# Patient Record
Sex: FEMALE | Race: WHITE | NOT HISPANIC OR LATINO | Employment: STUDENT | URBAN - METROPOLITAN AREA
[De-identification: names, ages, dates, MRNs, and addresses within clinical notes are randomized per-mention and may not be internally consistent; named-entity substitution may affect disease eponyms.]

---
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
## 2022-02-12 ENCOUNTER — OFFICE VISIT (OUTPATIENT)
Dept: URGENT CARE | Facility: MEDICAL CENTER | Age: 22
End: 2022-02-12
Payer: COMMERCIAL

## 2022-02-12 ENCOUNTER — APPOINTMENT (OUTPATIENT)
Dept: RADIOLOGY | Facility: MEDICAL CENTER | Age: 22
End: 2022-02-12
Payer: COMMERCIAL

## 2022-02-12 VITALS
WEIGHT: 293 LBS | HEIGHT: 71 IN | TEMPERATURE: 98.5 F | BODY MASS INDEX: 41.02 KG/M2 | OXYGEN SATURATION: 100 % | HEART RATE: 95 BPM

## 2022-02-12 DIAGNOSIS — R05.9 COUGH: Primary | ICD-10-CM

## 2022-02-12 DIAGNOSIS — R05.9 COUGH: ICD-10-CM

## 2022-02-12 PROCEDURE — 99213 OFFICE O/P EST LOW 20 MIN: CPT | Performed by: PHYSICIAN ASSISTANT

## 2022-02-12 PROCEDURE — 71046 X-RAY EXAM CHEST 2 VIEWS: CPT

## 2022-02-12 RX ORDER — ALBUTEROL SULFATE 90 UG/1
2 AEROSOL, METERED RESPIRATORY (INHALATION) EVERY 6 HOURS PRN
Qty: 8.5 G | Refills: 0 | Status: SHIPPED | OUTPATIENT
Start: 2022-02-12

## 2022-02-12 RX ORDER — PREDNISONE 20 MG/1
40 TABLET ORAL DAILY
Qty: 10 TABLET | Refills: 0 | Status: SHIPPED | OUTPATIENT
Start: 2022-02-12 | End: 2022-02-17

## 2022-02-12 RX ORDER — LAMOTRIGINE 300 MG/1
1 TABLET, EXTENDED RELEASE ORAL 2 TIMES DAILY
COMMUNITY
Start: 2022-01-18

## 2022-02-12 NOTE — PATIENT INSTRUCTIONS
Cough  Prednisone 40 mg daily x 5 days  proventil 2 puffs every 6 hours as needed  Follow up with PCP in 3-5 days  Proceed to  ER if symptoms worsen  Acute Cough   WHAT YOU NEED TO KNOW:   An acute cough can last up to 3 weeks  Common causes of an acute cough include a cold, allergies, or a lung infection  DISCHARGE INSTRUCTIONS:   Return to the emergency department if:   · You have trouble breathing or feel short of breath  · You cough up blood, or you see blood in your mucus  · You faint or feel weak or dizzy  · You have chest pain when you cough or take a deep breath  · You have new wheezing  Contact your healthcare provider if:   · You have a fever  · Your cough lasts longer than 4 weeks  · Your symptoms do not improve with treatment  · You have questions or concerns about your condition or care  Medicines:   · Medicines  may be needed to stop the cough, decrease swelling in your airways, or help open your airways  Medicine may also be given to help you cough up mucus  Ask your healthcare provider what over-the-counter medicines you can take  If you have an infection caused by bacteria, you may need antibiotics  · Take your medicine as directed  Contact your healthcare provider if you think your medicine is not helping or if you have side effects  Tell him or her if you are allergic to any medicine  Keep a list of the medicines, vitamins, and herbs you take  Include the amounts, and when and why you take them  Bring the list or the pill bottles to follow-up visits  Carry your medicine list with you in case of an emergency  Manage your symptoms:   · Do not smoke and stay away from others who smoke  Nicotine and other chemicals in cigarettes and cigars can cause lung damage and make your cough worse  Ask your healthcare provider for information if you currently smoke and need help to quit  E-cigarettes or smokeless tobacco still contain nicotine   Talk to your healthcare provider before you use these products  · Drink extra liquids as directed  Liquids will help thin and loosen mucus so you can cough it up  Liquids will also help prevent dehydration  Examples of good liquids to drink include water, fruit juice, and broth  Do not drink liquids that contain caffeine  Caffeine can increase your risk for dehydration  Ask your healthcare provider how much liquid to drink each day  · Rest as directed  Do not do activities that make your cough worse, such as exercise  · Use a humidifier or vaporizer  Use a cool mist humidifier or a vaporizer to increase air moisture in your home  This may make it easier for you to breathe and help decrease your cough  · Eat 2 to 5 mL of honey 2 times each day  Honey can help thin mucus and decrease your cough  · Use cough drops or lozenges  These can help decrease throat irritation and your cough  Follow up with your healthcare provider as directed:  Write down your questions so you remember to ask them during your visits  © Copyright [employer] 2021 Information is for End User's use only and may not be sold, redistributed or otherwise used for commercial purposes  All illustrations and images included in CareNotes® are the copyrighted property of [employer]  or [facility]   The above information is an  only  It is not intended as medical advice for individual conditions or treatments  Talk to your doctor, nurse or pharmacist before following any medical regimen to see if it is safe and effective for you

## 2022-02-12 NOTE — PROGRESS NOTES
[facility]        NAME: Saritha Stein is a 25 y o  female  : 2000    MRN: [id number]  DATE: 2022  TIME: 10:10 AM    Assessment and Plan   Cough [R05 9]  1  Cough  XR chest pa & lateral         Patient Instructions     Cough  Prednisone 40 mg daily x 5 days  proventil 2 puffs every 6 hours as needed  Follow up with PCP in 3-5 days  Proceed to  ER if symptoms worsen  Chief Complaint     Chief Complaint   Patient presents with    Cough     lingering about a week now  was told she had covid a month ago     Shortness of Breath     feels wheezing in her sinuses and lungs and PND  she has nose bleeds in the morning   Nausea     constantly feeeling nauseas from the Post nasal drip , she says her chest feels heavy          History of Present Illness       26 y/o female presents c/o non productive cough, and wheezing  States the cough keeps her up at night  Patient denies fever, chills, SOB  States she had covid 1 month ago and had recovered and was symptom free x 1 week and then developed cough and wheezing      Review of Systems   Review of Systems   Constitutional: Negative  HENT: Negative  Eyes: Negative  Respiratory: Positive for cough and wheezing  Negative for chest tightness, shortness of breath and stridor  Cardiovascular: Negative  Negative for chest pain, palpitations and leg swelling           Current Medications       Current Outpatient Medications:     clonazePAM (KlonoPIN) 2 mg tablet, Place 2 mg under the tongue as needed for seizures, Disp: , Rfl:     lamoTRIgine  MG TB24, Take 1 tablet by mouth 2 (two) times a day, Disp: , Rfl:     lamoTRIgine  MG TB24, Take 1 tablet by mouth 2 (two) times a day, Disp: , Rfl:     zonisamide (ZONEGRAN) 100 mg capsule, 100 mg 2 (two) times a day , Disp: , Rfl:     Current Allergies     Allergies as of 2022 - Reviewed 2022   Allergen Reaction Noted    Cetirizine hcl  11/15/2018    Klonopin [clonazepam] Other (See Comments) 2020            The following portions of the patient's history were reviewed and updated as appropriate: allergies, current medications, past family history, past medical history, past social history, past surgical history and problem list      Past Medical History:   Diagnosis Date    Anxiety     Autism     Depression     Seasonal allergies     Seizures ([address] )     last was a year ago    Sensory integration disorder     Tendonitis        Past Surgical History:   Procedure Laterality Date     SECTION      CHOLECYSTECTOMY      10/20    NO PAST SURGERIES      WI LAP,CHOLECYSTECTOMY N/A 10/15/2020    Procedure: CHOLECYSTECTOMY LAPAROSCOPIC;  Surgeon: Angelina Bowen MD;  Location: [facility] MAIN OR;  Service: General       Family History   Problem Relation Age of Onset    Thyroid cancer Mother     Hiatal hernia Mother     Asthma Mother     Diabetes Family     Heart disease Family     Hypertension Family     Esophageal cancer Maternal Grandmother     Diabetes Maternal Grandfather          Medications have been verified  Objective   Pulse 95   Temp 98 5 °F (36 9 °C) (Temporal)   Ht 5' 11" (1 803 m)   Wt (!) 152 kg (334 lb)   SpO2 100%   BMI 46 58 kg/m²        Physical Exam     Physical Exam  Constitutional:       Appearance: She is well-developed  HENT:      Head: Normocephalic and atraumatic  Right Ear: External ear normal       Left Ear: External ear normal       Nose: Nose normal       Mouth/Throat:      Pharynx: No oropharyngeal exudate  Cardiovascular:      Rate and Rhythm: Normal rate and regular rhythm  Heart sounds: Normal heart sounds  Pulmonary:      Effort: Pulmonary effort is normal  No respiratory distress  Breath sounds: Normal breath sounds  No wheezing or rales  Chest:      Chest wall: No tenderness  Musculoskeletal:      Cervical back: Normal range of motion and neck supple     Lymphadenopathy: Cervical: No cervical adenopathy

## 2022-02-14 ENCOUNTER — OFFICE VISIT (OUTPATIENT)
Dept: URGENT CARE | Facility: MEDICAL CENTER | Age: 22
End: 2022-02-14
Payer: COMMERCIAL

## 2022-02-14 ENCOUNTER — APPOINTMENT (OUTPATIENT)
Dept: RADIOLOGY | Facility: MEDICAL CENTER | Age: 22
End: 2022-02-14
Payer: COMMERCIAL

## 2022-02-14 ENCOUNTER — APPOINTMENT (OUTPATIENT)
Dept: URGENT CARE | Facility: MEDICAL CENTER | Age: 22
End: 2022-02-14
Payer: COMMERCIAL

## 2022-02-14 VITALS
HEIGHT: 71 IN | HEART RATE: 80 BPM | SYSTOLIC BLOOD PRESSURE: 139 MMHG | RESPIRATION RATE: 18 BRPM | BODY MASS INDEX: 41.02 KG/M2 | OXYGEN SATURATION: 98 % | DIASTOLIC BLOOD PRESSURE: 75 MMHG | TEMPERATURE: 97.8 F | WEIGHT: 293 LBS

## 2022-02-14 DIAGNOSIS — S20.211A CONTUSION OF RIB ON RIGHT SIDE, INITIAL ENCOUNTER: Primary | ICD-10-CM

## 2022-02-14 DIAGNOSIS — R07.81 RIB PAIN: ICD-10-CM

## 2022-02-14 PROCEDURE — S9083 URGENT CARE CENTER GLOBAL: HCPCS | Performed by: PHYSICIAN ASSISTANT

## 2022-02-14 PROCEDURE — G0382 LEV 3 HOSP TYPE B ED VISIT: HCPCS | Performed by: PHYSICIAN ASSISTANT

## 2022-02-14 PROCEDURE — 71101 X-RAY EXAM UNILAT RIBS/CHEST: CPT

## 2022-02-14 NOTE — PROGRESS NOTES
[facility] Now        NAME: Francisco Chang is a 25 y o  female  : 2000    MRN: [id number]  DATE: 2022  TIME: 12:51 PM    Assessment and Plan   Contusion of rib on right side, initial encounter [S20 211A]  1  Contusion of rib on right side, initial encounter  XR ribs right w pa chest min 3 views         Patient Instructions     Contusion rib  Follow up with PCP in 3-5 days  Proceed to  ER if symptoms worsen  Chief Complaint     Chief Complaint   Patient presents with    Rib Pain     patient states she fell 10 days ago landing on bilateral breast; patient states she has R rib pain worse with inspiration; taking tylenol with no relief          History of Present Illness       25year-old female who presents complaining of right rib pain  Patient states that she previously fell and has been having pain since to the same area  Patient denies shortness of breath, nausea, fevers  Review of Systems   Review of Systems   Constitutional: Negative  HENT: Negative  Eyes: Negative  Respiratory: Negative  Negative for cough, chest tightness, shortness of breath, wheezing and stridor  Cardiovascular: Negative  Negative for chest pain, palpitations and leg swelling           Current Medications       Current Outpatient Medications:     albuterol (ProAir HFA) 90 mcg/act inhaler, Inhale 2 puffs every 6 (six) hours as needed for wheezing, Disp: 8 5 g, Rfl: 0    clonazePAM (KlonoPIN) 2 mg tablet, Place 2 mg under the tongue as needed for seizures, Disp: , Rfl:     lamoTRIgine  MG TB24, Take 1 tablet by mouth 2 (two) times a day, Disp: , Rfl:     lamoTRIgine  MG TB24, Take 1 tablet by mouth 2 (two) times a day, Disp: , Rfl:     predniSONE 20 mg tablet, Take 2 tablets (40 mg total) by mouth daily for 5 days, Disp: 10 tablet, Rfl: 0    zonisamide (ZONEGRAN) 100 mg capsule, 100 mg 2 (two) times a day , Disp: , Rfl:     Current Allergies     Allergies as of 2022 - Reviewed 2022   Allergen Reaction Noted    Cetirizine hcl  11/15/2018    Klonopin [clonazepam] Other (See Comments) 2020            The following portions of the patient's history were reviewed and updated as appropriate: allergies, current medications, past family history, past medical history, past social history, past surgical history and problem list      Past Medical History:   Diagnosis Date    Anxiety     Autism     Depression     Seasonal allergies     Seizures ([address] )     last was a year ago    Sensory integration disorder     Tendonitis        Past Surgical History:   Procedure Laterality Date     SECTION      CHOLECYSTECTOMY      10/20    NO PAST SURGERIES      MI LAP,CHOLECYSTECTOMY N/A 10/15/2020    Procedure: CHOLECYSTECTOMY LAPAROSCOPIC;  Surgeon: Ora Simmons MD;  Location: [address] [place] [address];  Service: General       Family History   Problem Relation Age of Onset    Thyroid cancer Mother     Hiatal hernia Mother     Asthma Mother     Diabetes Family     Heart disease Family     Hypertension Family     Esophageal cancer Maternal Grandmother     Diabetes Maternal Grandfather          Medications have been verified  Objective   /75 (BP Location: Left arm, Patient Position: Sitting)   Pulse 80   Temp 97 8 °F (36 6 °C)   Resp 18   Ht 5' 11" (1 803 m)   Wt (!) 152 kg (334 lb)   SpO2 98%   BMI 46 58 kg/m²        Physical Exam     Physical Exam  Constitutional:       Appearance: She is well-developed  HENT:      Head: Normocephalic and atraumatic  Right Ear: External ear normal       Left Ear: External ear normal       Nose: Nose normal       Mouth/Throat:      Pharynx: No oropharyngeal exudate  Cardiovascular:      Rate and Rhythm: Normal rate and regular rhythm  Heart sounds: Normal heart sounds  Pulmonary:      Effort: Pulmonary effort is normal  No respiratory distress  Breath sounds: Normal breath sounds   No wheezing or rales  Chest:      Chest wall: No tenderness  Abdominal:      General: Bowel sounds are normal  There is no distension  Palpations: Abdomen is soft  There is no mass  Tenderness: There is no abdominal tenderness  There is no right CVA tenderness, left CVA tenderness, guarding or rebound  Musculoskeletal:      Cervical back: Normal range of motion and neck supple  Lymphadenopathy:      Cervical: No cervical adenopathy

## 2022-02-14 NOTE — PATIENT INSTRUCTIONS
Contusion rib  Follow up with PCP in 3-5 days  Proceed to  ER if symptoms worsen  Rib Contusion   WHAT YOU NEED TO KNOW:   A rib contusion is a bruise on one or more of your ribs  DISCHARGE INSTRUCTIONS:   Return to the emergency department if:   · You have increased chest pain  · You have shortness of breath  · You start to cough up blood  · Your pain does not improve with pain medicine  Contact your healthcare provider if:   · You have a cough  · You have a fever  · You have questions or concerns about your condition or care  Medicines: You may need any of the following:  · NSAIDs , such as ibuprofen, help decrease swelling, pain, and fever  This medicine is available with or without a doctor's order  NSAIDs can cause stomach bleeding or kidney problems in certain people  If you take blood thinner medicine, always ask if NSAIDs are safe for you  Always read the medicine label and follow directions  Do not give these medicines to children under 10months of age without direction from your child's healthcare provider  · Prescription pain medicine  may be given  Ask how to take this medicine safely  · Take your medicine as directed  Contact your healthcare provider if you think your medicine is not helping or if you have side effects  Tell him of her if you are allergic to any medicine  Keep a list of the medicines, vitamins, and herbs you take  Include the amounts, and when and why you take them  Bring the list or the pill bottles to follow-up visits  Carry your medicine list with you in case of an emergency  Deep breathing:   · To help prevent pneumonia, take 10 deep breaths every hour, even when you wake up during the night  Brace your ribs with your hands or a pillow while you take deep breaths or cough  This will help decrease your pain  · You may need to use an incentive spirometer to help you take deeper breaths   Put the plastic piece into your mouth and take a very deep breath  Hold your breath as long as you can  Then let out your breath  Do this 10 times in a row every hour while you are awake  Rest:  Rest your ribs to decrease swelling and allow the injury to heal faster  Avoid activities that may cause more pain or damage to your ribs  As your pain decreases, begin movements slowly  Ice:  Ice helps decrease swelling and pain  Ice may also help prevent tissue damage  Use an ice pack or put crushed ice in a plastic bag  Cover it with a towel and place it on your bruised area for 15 to 20 minutes every hour as directed  Follow up with your doctor as directed:  Write down your questions so you remember to ask them during your visits  © Copyright [employer] 2021 Information is for End User's use only and may not be sold, redistributed or otherwise used for commercial purposes  All illustrations and images included in CareNotes® are the copyrighted property of [employer]  or Gene Grant   The above information is an  only  It is not intended as medical advice for individual conditions or treatments  Talk to your doctor, nurse or pharmacist before following any medical regimen to see if it is safe and effective for you

## 2022-03-10 ENCOUNTER — ANNUAL EXAM (OUTPATIENT)
Dept: OBGYN CLINIC | Age: 22
End: 2022-03-10
Payer: COMMERCIAL

## 2022-03-10 VITALS
HEIGHT: 71 IN | SYSTOLIC BLOOD PRESSURE: 124 MMHG | WEIGHT: 293 LBS | DIASTOLIC BLOOD PRESSURE: 82 MMHG | BODY MASS INDEX: 41.02 KG/M2

## 2022-03-10 DIAGNOSIS — Z97.5 NEXPLANON IN PLACE: ICD-10-CM

## 2022-03-10 DIAGNOSIS — Z01.419 ENCOUNTER FOR GYNECOLOGICAL EXAMINATION WITHOUT ABNORMAL FINDING: Primary | ICD-10-CM

## 2022-03-10 PROCEDURE — S0612 ANNUAL GYNECOLOGICAL EXAMINA: HCPCS | Performed by: NURSE PRACTITIONER

## 2022-03-10 RX ORDER — AZELASTINE HCL 205.5 UG/1
SPRAY NASAL
COMMUNITY
Start: 2022-01-08 | End: 2022-03-10 | Stop reason: ALTCHOICE

## 2022-03-10 RX ORDER — PREDNISONE 1 MG/1
TABLET ORAL
COMMUNITY
Start: 2022-01-11 | End: 2022-03-10 | Stop reason: ALTCHOICE

## 2022-03-10 RX ORDER — AMOXICILLIN AND CLAVULANATE POTASSIUM 875; 125 MG/1; MG/1
1 TABLET, FILM COATED ORAL 2 TIMES DAILY WITH MEALS
COMMUNITY
Start: 2022-01-08 | End: 2022-03-10 | Stop reason: ALTCHOICE

## 2022-03-10 NOTE — PATIENT INSTRUCTIONS
Breast Self Exam for Women   AMBULATORY CARE:   A breast self-exam (BSE)  is a way to check your breasts for lumps and other changes  Regular BSEs can help you know how your breasts normally look and feel  Most breast lumps or changes are not cancer, but you should always have them checked by a healthcare provider  Why you should do a BSE:  Breast cancer is the most common type of cancer in women  Even if you have mammograms, you may still want to do a BSE regularly  If you know how your breasts normally feel and look, it may help you know when to contact your healthcare provider  Mammograms can miss some cancers  You may find a lump during a BSE that did not show up on a mammogram   When you should do a BSE:  If you have periods, you may want to do your BSE 1 week after your period ends  This is the time when your breasts may be the least swollen, lumpy, or tender  You can do regular BSEs even if you are breastfeeding or have breast implants  Call your doctor if:   · You find any lumps or changes in your breasts  · You have breast pain or fluid coming from your nipples  · You have questions or concerns about your condition or care  How to do a BSE:       · Look at your breasts in a mirror  Look at the size and shape of each breast and nipple  Check for swelling, lumps, dimpling, scaly skin, or other skin changes  Look for nipple changes, such as a nipple that is painful or beginning to pull inward  Gently squeeze both nipples and check to see if fluid (that is not breast milk) comes out of them  If you find any of these or other breast changes, contact your healthcare provider  Check your breasts while you sit or  the following 3 positions:    ? Hang your arms down at your sides  ? Raise your hands and join them behind your head  ? Put firm pressure with your hands on your hips  Bend slightly forward while you look at your breasts in the mirror  · Lie down and feel your breasts    When you lie down, your breast tissue spreads out evenly over your chest  This makes it easier for you to feel for lumps and anything that may not be normal for your breasts  Do a BSE on one breast at a time  ? Place a small pillow or towel under your left shoulder  Put your left arm behind your head  ? Use the 3 middle fingers of your right hand  Use your fingertip pads, on the top of your fingers  Your fingertip pad is the most sensitive part of your finger  ? Use small circles to feel your breast tissue  Use your fingertip pads to make dime-sized, overlapping circles on your breast and armpits  Use light, medium, and firm pressure  First, press lightly  Second, press with medium pressure to feel a little deeper into the breast  Last, use firm pressure to feel deep within your breast     ? Examine your entire breast area  Examine the breast area from above the breast to below the breast where you feel only ribs  Make small circles with your fingertips, starting in the middle of your armpit  Make circles going up and down the breast area  Continue toward your breast and all the way across it  Examine the area from your armpit all the way over to the middle of your chest (breastbone)  Stop at the middle of your chest     ? Move the pillow or towel to your right shoulder, and put your right arm behind your head  Use the 3 fingertip pads of your left hand, and repeat the above steps to do a BSE on your right breast     What else you can do to check for breast problems or cancer:  Talk to your healthcare provider about mammograms  A mammogram is an x-ray of your breasts to screen for breast cancer or other problems  Your provider can tell you the benefits and risks of mammograms  The first mammogram is usually at age 39 or 48  Your provider may recommend you start at 36 or younger if your risk for breast cancer is high  Mammograms usually continue every 1 to 2 years until age 76         Follow up with your doctor as directed:  Write down your questions so you remember to ask them during your visits  © Copyright [employer] 2022 Information is for End User's use only and may not be sold, redistributed or otherwise used for commercial purposes  All illustrations and images included in CareNotes® are the copyrighted property of [employer]  or Gene Kendrick  The above information is an  only  It is not intended as medical advice for individual conditions or treatments  Talk to your doctor, nurse or pharmacist before following any medical regimen to see if it is safe and effective for you  Safe Sex Practices   AMBULATORY CARE:   Safe sex practices  are ways to prevent pregnancy and the spread of sexually transmitted infections (STIs)  An STI happens when a virus or bacteria are spread through sexual activity  Safe sex practices help decrease or prevent body fluid exchange during sex  Body fluids include saliva, urine, blood, vaginal fluids, and semen  Oral, vaginal, and anal sex can all spread STIs  Seek care immediately if:   · A condom breaks, leaks, or slips off while you are having sex  · You notice sores on your penis, vagina, anal area, or skin around them  · You have had unsafe sex and want to discuss emergency contraception or treatment for STI exposure  Call your doctor if:   · You or your female sex partner might be pregnant  · You have questions or concerns about your condition or care  Safe sex practices to follow before you have sex:   · Talk to a new partner before you have sex  Tell your partner if you have an STI  Ask about his or her sex history and if he or she has a current or past STI  Your partner may need to be tested and treated  Do not have sex while you are being treated for an STI, or with a partner who is being treated  · Limit your number of sex partners  More than one sex partner can increase your risk for an STI   Do not have sex with anyone whose sex history you do not know  · Get tested for STIs if needed  Get tested if you had sex with someone who has an STI  Get tested if you have unprotected sex with any new partner  · Talk to your healthcare provider about birth control  Birth control can help prevent an unwanted pregnancy  There are many different types of birth control  Talk to your healthcare provider about which birth control method is right for you  · Ask about medicines to lower your risk for some STIs:      ? Vaccines  can help protect you from hepatitis A, hepatitis B, and the human papillomavirus (HPV)  The HPV vaccine is usually given at 11 years, but it may be given through 26 years to both females and males  Your provider can give you more information on vaccines to prevent STIs  ? Pre-exposure prophylaxis (PrEP)  may be given if you are at high risk for HIV  PrEP is taken every day to prevent the virus from fully infecting the body  · Do not use alcohol or drugs before sex  These can prevent you from thinking clearly and increase your risk for unsafe sex  Safe sex practices to follow while you are having sex:   · Use condoms and barrier methods for all types of sexual contact  This includes oral, vaginal, and anal sex  Male and female condoms are available  Make sure that the condom fits and is put on correctly  Rubber latex sheets or dental dams can be used for oral sex  Use a new condom or latex barrier each time you have sex  Use latex condoms, if possible  Lambskin or natural condoms do not prevent STIs  If you or your partner is allergic to latex, use a nonlatex product, such as polyurethane  Use a second form of birth control with the condom to prevent pregnancy and STIs  Do not use male and female condoms together  · Only use water-based lubricants during sex  Water-based lubricants help prevent sores or cuts in the vagina or on the penis  Prevent sores or cuts to decrease your risk for an STI   Do not use oil-based lubricants, such as baby oil or hand lotion, with latex condoms or barriers  These will weaken the latex and may cause the condom to break  · Do not use chemicals that irritate your skin  Products that contain chemical irritants, such as spermicides, can irritate the lining of your vagina or rectum  Irritation may cause sores that can increase your risk for an STI  · Be careful when you have sex if you have open sores or cuts  Open sores or cuts may increase your risk for an STI  Keep all open sores or cuts covered during sex  Do not have oral sex if you have cuts or sores in your mouth  · Do not do activities that can pass germs  Do not use saliva as a lubricant or share sex toys  Follow up with your doctor as directed:  Write down your questions so you remember to ask them during your visits  © Copyright [employer] 2022 Information is for End User's use only and may not be sold, redistributed or otherwise used for commercial purposes  All illustrations and images included in CareNotes® are the copyrighted property of [employer] , Inc  or Gene Grant   The above information is an  only  It is not intended as medical advice for individual conditions or treatments  Talk to your doctor, nurse or pharmacist before following any medical regimen to see if it is safe and effective for you
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
## 2022-03-31 ENCOUNTER — CONSULT (OUTPATIENT)
Dept: PULMONOLOGY | Facility: CLINIC | Age: 22
End: 2022-03-31
Payer: COMMERCIAL

## 2022-03-31 VITALS
BODY MASS INDEX: 41.02 KG/M2 | WEIGHT: 293 LBS | HEART RATE: 101 BPM | RESPIRATION RATE: 18 BRPM | SYSTOLIC BLOOD PRESSURE: 118 MMHG | DIASTOLIC BLOOD PRESSURE: 74 MMHG | TEMPERATURE: 97.8 F | OXYGEN SATURATION: 98 % | HEIGHT: 71 IN

## 2022-03-31 DIAGNOSIS — J45.30 MILD PERSISTENT REACTIVE AIRWAY DISEASE WITHOUT COMPLICATION: Primary | ICD-10-CM

## 2022-03-31 DIAGNOSIS — E66.01 MORBID OBESITY (HCC): ICD-10-CM

## 2022-03-31 PROCEDURE — 99204 OFFICE O/P NEW MOD 45 MIN: CPT | Performed by: INTERNAL MEDICINE

## 2022-03-31 RX ORDER — FLUTICASONE PROPIONATE AND SALMETEROL XINAFOATE 115; 21 UG/1; UG/1
2 AEROSOL, METERED RESPIRATORY (INHALATION) 2 TIMES DAILY
Qty: 8 G | Refills: 5 | Status: SHIPPED | OUTPATIENT
Start: 2022-03-31

## 2022-03-31 NOTE — PROGRESS NOTES
Pulmonary Consultation   Bruno Mcclain 25 y o  female MRN: [id number]    Encounter: [id number]      Reason for consultation:   Wheezing    Requesting physician:  Isaías Kwok DO       Impressions:   · Reactive airway disease  · Morbid obesity  Recommendations:  · Advair /21, 2 inhalations twice a day  · Albuterol rescue inhaler 2 inhalations 4 times a day as needed  · Weight loss  · Follow-up in 3 months  Discussion:  The patient has wheezing could be due to reactive airway disease or asthma  She has a family history of asthma  I have started her on Advair /21, 2 inhalations twice a day  She will use the albuterol rescue inhaler 2 inhalations 4 times a day as needed  I have explained to her how to use the rescue inhaler appropriately  I talked her about her weight and the importance of losing weight now than wait until complications start happened  I will see her in 3 months in a follow-up visit  History of Present Illness   HPI:  Bruno Mcclain is a 25 y o  female who is here for evaluation of weeks  The patient stated that she started having wheezing over the last few months after she got infected with COVID  She denies any significant cough or sputum production  She was prescribed albuterol which has helped her  She denies snoring  No nocturnal choking sensation  She feels tired during the day however she does have 9 month twins  Review of systems:  12 point review of systems was completed and was otherwise negative except as listed in HPI        Historical Information   Past Medical History:   Diagnosis Date    Anxiety     Autism     Depression     Seasonal allergies     Seizures ([address] )     last was a year ago    Sensory integration disorder     Tendonitis      Past Surgical History:   Procedure Laterality Date     SECTION      CHOLECYSTECTOMY      10/20    NO PAST SURGERIES      [facility] LAP,CHOLECYSTECTOMY N/A 10/15/2020 Procedure: CHOLECYSTECTOMY LAPAROSCOPIC;  Surgeon: Shayla Lema MD;  Location: [address] [place] [address] OR;  Service: General     Family History   Problem Relation Age of Onset    Thyroid cancer Mother     Hiatal hernia Mother     Asthma Mother     Diabetes Family     Heart disease Family     Hypertension Family     Esophageal cancer Maternal Grandmother     Diabetes Maternal Grandfather        Family History:  Her mother has bronchial asthma  Social History:  The patient lives with her family  She is a lifelong nonsmoker  She smoked marijuana once  Denies other recreational drug use  She works at a [employer]  Meds/Allergies   No current facility-administered medications for this visit  (Not in a hospital admission)    Allergies   Allergen Reactions    Cetirizine Hcl     Klonopin [Clonazepam] Other (See Comments)     Long term use causes suicidal thoughts  Has used short term for seizure rescue       Vitals: Blood pressure 118/74, pulse 101, temperature 97 8 °F (36 6 °C), resp  rate 18, height 5' 11" (1 803 m), weight (!) 152 kg (336 lb), last menstrual period 03/06/2022, SpO2 98 %, not currently breastfeeding ,      Physical exam:        Head/eyes:    Normocephalic, without obvious abnormality, atraumatic,         PERRL, extraocular muscles intact, no scleral icterus    Nose:   Nares normal, septum midline, mucosa normal, no drainage    or sinus tenderness   Throat:   Moist mucous membranes, no thrush   Neck:   Supple, trachea midline, no adenopathy; no carotid    bruit or JVD   Lungs:     Clear breath sounds  No wheezing or rhonchi          Heart:    Regular rate and rhythm, S1 and S2 normal, no murmur, rub   or gallop   Abdomen:     Soft, non-tender, bowel sounds active all four quadrants,     no masses, no organomegaly   Extremities:   Extremities normal, atraumatic, no cyanosis or edema   Skin:   Warm, dry, turgor normal, no rashes or lesions   Neurologic:   CNII-XII intact, normal strength, non-focal             Imaging and other studies: I have personally reviewed pertinent films in PACS chest x-rays reviewed on the [facility] system  It was within normal limits  Ref Range & Units 7/10/21  6:55 AM   Hemoglobin 11 5 - 14 5 g/dL 8 8 Low     Hematocrit 35 0 - 43 0 % 25 9 Low     WBC 4 0 - 10 0 thou/cmm 8 7    RBC 3 70 - 4 70 mill/cmm 3 09 Low     Platelet Count 348 - 350 thou/cmm 254    MPV 7 5 - 11 3 fL 7 9    MCV 80 - 100 fL 84    MCH 26 0 - 34 0 pg 28 5    MCHC 32 0 - 37 0 g/dL 34 0    RDW 12 0 - 16 0 % 15 9      Ref Range & Units 7/10/21  6:55 AM    Glucose 65 - 99 mg/dL 79    BUN 7 - 25 mg/dL 6 Low     Creatinine 0 40 - 1 10 mg/dL 0 46    Sodium 135 - 145 mmol/L 137    Potassium 3 5 - 5 2 mmol/L 4 0    Chloride 100 - 109 mmol/L 111 High     Carbon Dioxide 23 - 31 mmol/L 18 Low     Calcium 8 5 - 10 1 mg/dL 8 3 Low     Alkaline Phosphatase 35 - 120 U/L 163 High     Albumin 3 5 - 4 8 g/dL 2 0 Low     Bilirubin, Total 0 2 - 1 0 mg/dL 0 2    Comment: Use of this assay is not recommended for patients undergoing treatment with eltrombopag due to the potential for falsely elevated results     Protein, Total 6 3 - 8 3 g/dL 5 9 Low     AST <41 U/L 26    ALT <56 U/L 52    Anion Gap 3 - 11 8    eGFR, Non-African American >60 145    eGFR,  >60 168    eGFR Comment  Please refer to initial GFR, CALC footnote    Specimen Collected: 07/10/21  6:55 AM Last Resulted: 07/10/21  9:07 AM   Received From: [address]  Result Received: 08/24/21 11:32 AM    View Encounter     Received Information      Ordered On 7/10/2021  6:55 AM    Ordering Provider Authorizing Provider Ordering User Ordering Department   Generic External Data Provider Generic External Data Provider System Default User GENERIC EXTERNAL DATA DEPARTMENT               Specimen Description:           8/24/2021 11:32 AM          (suggestion)  Result Information displayed in this report will not trend and may not trigger automated decision support         Contains abnormal data COMPREHENSIVE METABOLIC PANEL  Order: [id number]   Ref Range & Units Value   Glucose 65 - 99 mg/dL 79    BUN 7 - 25 mg/dL 6 Low     Creatinine 0 40 - 1 10 mg/dL 0 46    Sodium 135 - 145 mmol/L 137    Potassium 3 5 - 5 2 mmol/L 4 0    Chloride 100 - 109 mmol/L 111 High     Carbon Dioxide 23 - 31 mmol/L 18 Low     Calcium 8 5 - 10 1 mg/dL 8 3 Low     Alkaline Phosphatase 35 - 120 U/L 163 High     Albumin 3 5 - 4 8 g/dL 2 0 Low     Bilirubin, Total 0 2 - 1 0 mg/dL 0 2    Comment: Use of this assay is not recommended for patients undergoing treatment with eltrombopag due to the potential for falsely elevated results     Protein, Total 6 3 - 8 3 g/dL 5 9 Low     AST <41 U/L 26    ALT <56 U/L 52    Anion Gap 3 - 11 8    eGFR, Non-African American >60 145    eGFR,  >60 168          Sartah Mcwilliams MD

## 2022-04-15 ENCOUNTER — TELEPHONE (OUTPATIENT)
Dept: GASTROENTEROLOGY | Facility: CLINIC | Age: 22
End: 2022-04-15

## 2022-04-16 ENCOUNTER — APPOINTMENT (OUTPATIENT)
Dept: LAB | Facility: MEDICAL CENTER | Age: 22
End: 2022-04-16
Payer: COMMERCIAL

## 2022-04-16 DIAGNOSIS — R74.8 ACID PHOSPHATASE ELEVATED: ICD-10-CM

## 2022-04-16 DIAGNOSIS — R53.81 DEBILITY: ICD-10-CM

## 2022-04-16 DIAGNOSIS — G40.309 GENERALIZED EPILEPSY (HCC): ICD-10-CM

## 2022-04-16 DIAGNOSIS — Z32.00 PREGNANCY EXAMINATION OR TEST, PREGNANCY UNCONFIRMED: ICD-10-CM

## 2022-04-16 DIAGNOSIS — R11.0 NAUSEA: ICD-10-CM

## 2022-04-16 LAB
ALBUMIN SERPL BCP-MCNC: 4 G/DL (ref 3.5–5)
ALP SERPL-CCNC: 124 U/L (ref 46–116)
ALT SERPL W P-5'-P-CCNC: 32 U/L (ref 12–78)
AMYLASE SERPL-CCNC: 18 IU/L (ref 25–115)
ANION GAP SERPL CALCULATED.3IONS-SCNC: 2 MMOL/L (ref 4–13)
AST SERPL W P-5'-P-CCNC: 18 U/L (ref 5–45)
B-HCG SERPL-ACNC: <2 MIU/ML
BASOPHILS # BLD AUTO: 0.05 THOUSANDS/ΜL (ref 0–0.1)
BASOPHILS NFR BLD AUTO: 1 % (ref 0–1)
BILIRUB SERPL-MCNC: 0.41 MG/DL (ref 0.2–1)
BUN SERPL-MCNC: 11 MG/DL (ref 5–25)
CALCIUM SERPL-MCNC: 9.2 MG/DL (ref 8.3–10.1)
CHLORIDE SERPL-SCNC: 111 MMOL/L (ref 100–108)
CHOLEST SERPL-MCNC: 162 MG/DL
CO2 SERPL-SCNC: 25 MMOL/L (ref 21–32)
CREAT SERPL-MCNC: 0.87 MG/DL (ref 0.6–1.3)
EOSINOPHIL # BLD AUTO: 0.2 THOUSAND/ΜL (ref 0–0.61)
EOSINOPHIL NFR BLD AUTO: 2 % (ref 0–6)
ERYTHROCYTE [DISTWIDTH] IN BLOOD BY AUTOMATED COUNT: 16.3 % (ref 11.6–15.1)
GFR SERPL CREATININE-BSD FRML MDRD: 96 ML/MIN/1.73SQ M
GLUCOSE P FAST SERPL-MCNC: 98 MG/DL (ref 65–99)
HCT VFR BLD AUTO: 37.4 % (ref 34.8–46.1)
HGB BLD-MCNC: 11.4 G/DL (ref 11.5–15.4)
IMM GRANULOCYTES # BLD AUTO: 0.02 THOUSAND/UL (ref 0–0.2)
IMM GRANULOCYTES NFR BLD AUTO: 0 % (ref 0–2)
LIPASE SERPL-CCNC: 57 U/L (ref 73–393)
LYMPHOCYTES # BLD AUTO: 1.83 THOUSANDS/ΜL (ref 0.6–4.47)
LYMPHOCYTES NFR BLD AUTO: 21 % (ref 14–44)
MCH RBC QN AUTO: 23.8 PG (ref 26.8–34.3)
MCHC RBC AUTO-ENTMCNC: 30.5 G/DL (ref 31.4–37.4)
MCV RBC AUTO: 78 FL (ref 82–98)
MONOCYTES # BLD AUTO: 0.44 THOUSAND/ΜL (ref 0.17–1.22)
MONOCYTES NFR BLD AUTO: 5 % (ref 4–12)
NEUTROPHILS # BLD AUTO: 6.24 THOUSANDS/ΜL (ref 1.85–7.62)
NEUTS SEG NFR BLD AUTO: 71 % (ref 43–75)
NRBC BLD AUTO-RTO: 0 /100 WBCS
PLATELET # BLD AUTO: 425 THOUSANDS/UL (ref 149–390)
PMV BLD AUTO: 9.9 FL (ref 8.9–12.7)
POTASSIUM SERPL-SCNC: 4 MMOL/L (ref 3.5–5.3)
PROT SERPL-MCNC: 7.8 G/DL (ref 6.4–8.2)
RBC # BLD AUTO: 4.79 MILLION/UL (ref 3.81–5.12)
SODIUM SERPL-SCNC: 138 MMOL/L (ref 136–145)
T3 SERPL-MCNC: 1.3 NG/ML (ref 0.6–1.8)
T4 SERPL-MCNC: 10.7 UG/DL (ref 4.7–13.3)
TRIGL SERPL-MCNC: 97 MG/DL
TSH SERPL DL<=0.05 MIU/L-ACNC: 1.71 UIU/ML (ref 0.45–4.5)
URATE SERPL-MCNC: 5.6 MG/DL (ref 2–6.8)
WBC # BLD AUTO: 8.78 THOUSAND/UL (ref 4.31–10.16)

## 2022-04-16 PROCEDURE — 80053 COMPREHEN METABOLIC PANEL: CPT

## 2022-04-16 PROCEDURE — 84436 ASSAY OF TOTAL THYROXINE: CPT

## 2022-04-16 PROCEDURE — 84702 CHORIONIC GONADOTROPIN TEST: CPT

## 2022-04-16 PROCEDURE — 80203 DRUG SCREEN QUANT ZONISAMIDE: CPT

## 2022-04-16 PROCEDURE — 84443 ASSAY THYROID STIM HORMONE: CPT

## 2022-04-16 PROCEDURE — 85025 COMPLETE CBC W/AUTO DIFF WBC: CPT

## 2022-04-16 PROCEDURE — 83690 ASSAY OF LIPASE: CPT

## 2022-04-16 PROCEDURE — 84550 ASSAY OF BLOOD/URIC ACID: CPT

## 2022-04-16 PROCEDURE — 82150 ASSAY OF AMYLASE: CPT

## 2022-04-16 PROCEDURE — 84478 ASSAY OF TRIGLYCERIDES: CPT

## 2022-04-16 PROCEDURE — 84480 ASSAY TRIIODOTHYRONINE (T3): CPT

## 2022-04-16 PROCEDURE — 36415 COLL VENOUS BLD VENIPUNCTURE: CPT

## 2022-04-16 PROCEDURE — 82465 ASSAY BLD/SERUM CHOLESTEROL: CPT

## 2022-04-16 PROCEDURE — 80175 DRUG SCREEN QUAN LAMOTRIGINE: CPT

## 2022-04-19 LAB
LAMOTRIGINE SERPL-MCNC: 3 UG/ML (ref 2–20)
ZONISAMIDE SERPL-MCNC: 21.3 UG/ML (ref 10–40)

## 2022-04-30 ENCOUNTER — NURSE TRIAGE (OUTPATIENT)
Dept: OTHER | Facility: OTHER | Age: 22
End: 2022-04-30

## 2022-04-30 ENCOUNTER — HOSPITAL ENCOUNTER (EMERGENCY)
Facility: HOSPITAL | Age: 22
Discharge: HOME | End: 2022-04-30
Attending: EMERGENCY MEDICINE | Admitting: EMERGENCY MEDICINE
Payer: COMMERCIAL

## 2022-04-30 VITALS
DIASTOLIC BLOOD PRESSURE: 88 MMHG | OXYGEN SATURATION: 98 % | TEMPERATURE: 99.1 F | SYSTOLIC BLOOD PRESSURE: 135 MMHG | RESPIRATION RATE: 21 BRPM | HEART RATE: 92 BPM

## 2022-04-30 DIAGNOSIS — R10.9 ABDOMINAL CRAMPING: ICD-10-CM

## 2022-04-30 DIAGNOSIS — N93.9 ABNORMAL UTERINE BLEEDING: Primary | ICD-10-CM

## 2022-04-30 LAB
ALBUMIN SERPL BCP-MCNC: 3.6 G/DL (ref 3.5–5)
ALP SERPL-CCNC: 130 U/L (ref 46–116)
ALT SERPL W P-5'-P-CCNC: 26 U/L (ref 12–78)
AMORPH PHOS CRY URNS QL MICRO: ABNORMAL /HPF
ANION GAP SERPL CALCULATED.3IONS-SCNC: 9 MMOL/L (ref 4–13)
AST SERPL W P-5'-P-CCNC: 11 U/L (ref 5–45)
BACTERIA UR QL AUTO: ABNORMAL /HPF
BASOPHILS # BLD AUTO: 0.06 THOUSANDS/ΜL (ref 0–0.1)
BASOPHILS NFR BLD AUTO: 1 % (ref 0–1)
BILIRUB SERPL-MCNC: 0.12 MG/DL (ref 0.2–1)
BILIRUB UR QL STRIP: NEGATIVE
BUN SERPL-MCNC: 14 MG/DL (ref 5–25)
CALCIUM SERPL-MCNC: 8.8 MG/DL (ref 8.3–10.1)
CHLORIDE SERPL-SCNC: 106 MMOL/L (ref 100–108)
CLARITY UR: ABNORMAL
CO2 SERPL-SCNC: 25 MMOL/L (ref 21–32)
COLOR UR: YELLOW
CREAT SERPL-MCNC: 1.11 MG/DL (ref 0.6–1.3)
EOSINOPHIL # BLD AUTO: 0.14 THOUSAND/ΜL (ref 0–0.61)
EOSINOPHIL NFR BLD AUTO: 1 % (ref 0–6)
ERYTHROCYTE [DISTWIDTH] IN BLOOD BY AUTOMATED COUNT: 15.7 % (ref 11.6–15.1)
GFR SERPL CREATININE-BSD FRML MDRD: 71 ML/MIN/1.73SQ M
GLUCOSE SERPL-MCNC: 93 MG/DL (ref 65–140)
GLUCOSE UR STRIP-MCNC: NEGATIVE MG/DL
HCG SERPL QL: NEGATIVE
HCT VFR BLD AUTO: 36.5 % (ref 34.8–46.1)
HGB BLD-MCNC: 11.1 G/DL (ref 11.5–15.4)
HGB UR QL STRIP.AUTO: ABNORMAL
IMM GRANULOCYTES # BLD AUTO: 0.02 THOUSAND/UL (ref 0–0.2)
IMM GRANULOCYTES NFR BLD AUTO: 0 % (ref 0–2)
KETONES UR STRIP-MCNC: NEGATIVE MG/DL
LEUKOCYTE ESTERASE UR QL STRIP: NEGATIVE
LYMPHOCYTES # BLD AUTO: 2.51 THOUSANDS/ΜL (ref 0.6–4.47)
LYMPHOCYTES NFR BLD AUTO: 25 % (ref 14–44)
MCH RBC QN AUTO: 24.1 PG (ref 26.8–34.3)
MCHC RBC AUTO-ENTMCNC: 30.4 G/DL (ref 31.4–37.4)
MCV RBC AUTO: 79 FL (ref 82–98)
MONOCYTES # BLD AUTO: 0.51 THOUSAND/ΜL (ref 0.17–1.22)
MONOCYTES NFR BLD AUTO: 5 % (ref 4–12)
NEUTROPHILS # BLD AUTO: 6.83 THOUSANDS/ΜL (ref 1.85–7.62)
NEUTS SEG NFR BLD AUTO: 68 % (ref 43–75)
NITRITE UR QL STRIP: NEGATIVE
NON-SQ EPI CELLS URNS QL MICRO: ABNORMAL /HPF
NRBC BLD AUTO-RTO: 0 /100 WBCS
PH UR STRIP.AUTO: 8.5 [PH]
PLATELET # BLD AUTO: 398 THOUSANDS/UL (ref 149–390)
PMV BLD AUTO: 9.5 FL (ref 8.9–12.7)
POTASSIUM SERPL-SCNC: 3.8 MMOL/L (ref 3.5–5.3)
PROT SERPL-MCNC: 7.5 G/DL (ref 6.4–8.2)
PROT UR STRIP-MCNC: NEGATIVE MG/DL
RBC # BLD AUTO: 4.61 MILLION/UL (ref 3.81–5.12)
RBC #/AREA URNS AUTO: ABNORMAL /HPF
SODIUM SERPL-SCNC: 140 MMOL/L (ref 136–145)
SP GR UR STRIP.AUTO: 1.02 (ref 1–1.03)
UROBILINOGEN UR QL STRIP.AUTO: 0.2 E.U./DL
WBC # BLD AUTO: 10.07 THOUSAND/UL (ref 4.31–10.16)
WBC #/AREA URNS AUTO: ABNORMAL /HPF

## 2022-04-30 PROCEDURE — 80053 COMPREHEN METABOLIC PANEL: CPT | Performed by: EMERGENCY MEDICINE

## 2022-04-30 PROCEDURE — 81001 URINALYSIS AUTO W/SCOPE: CPT | Performed by: EMERGENCY MEDICINE

## 2022-04-30 PROCEDURE — 84703 CHORIONIC GONADOTROPIN ASSAY: CPT | Performed by: EMERGENCY MEDICINE

## 2022-04-30 PROCEDURE — 85025 COMPLETE CBC W/AUTO DIFF WBC: CPT | Performed by: EMERGENCY MEDICINE

## 2022-04-30 PROCEDURE — 99284 EMERGENCY DEPT VISIT MOD MDM: CPT

## 2022-04-30 PROCEDURE — 36415 COLL VENOUS BLD VENIPUNCTURE: CPT | Performed by: EMERGENCY MEDICINE

## 2022-04-30 PROCEDURE — 99284 EMERGENCY DEPT VISIT MOD MDM: CPT | Performed by: EMERGENCY MEDICINE

## 2022-04-30 RX ORDER — DICYCLOMINE HCL 20 MG
20 TABLET ORAL ONCE
Status: COMPLETED | OUTPATIENT
Start: 2022-04-30 | End: 2022-04-30

## 2022-04-30 RX ORDER — DICYCLOMINE HCL 20 MG
20 TABLET ORAL 2 TIMES DAILY
Qty: 20 TABLET | Refills: 0 | Status: SHIPPED | OUTPATIENT
Start: 2022-04-30

## 2022-04-30 RX ORDER — NAPROXEN 250 MG/1
500 TABLET ORAL ONCE
Status: COMPLETED | OUTPATIENT
Start: 2022-04-30 | End: 2022-04-30

## 2022-04-30 RX ORDER — NAPROXEN 500 MG/1
500 TABLET ORAL 2 TIMES DAILY WITH MEALS
Qty: 30 TABLET | Refills: 0 | Status: SHIPPED | OUTPATIENT
Start: 2022-04-30

## 2022-04-30 RX ADMIN — DICYCLOMINE HYDROCHLORIDE 20 MG: 20 TABLET ORAL at 21:11

## 2022-04-30 RX ADMIN — NAPROXEN 500 MG: 250 TABLET ORAL at 21:11

## 2022-04-30 NOTE — TELEPHONE ENCOUNTER
Regarding: bleeding  ----- Message from Meka Peralta sent at 4/30/2022  7:06 PM EDT -----  ''i just got off my period on Tuesday and it started again   I've had some stomach pain and nausea "

## 2022-04-30 NOTE — TELEPHONE ENCOUNTER
Regarding: bleeding  ----- Message from Gustav Felty sent at 4/30/2022  4:36 PM EDT -----  "i just got off my period on Tuesday and it started again   I've had some stomach pain and nausea "

## 2022-04-30 NOTE — TELEPHONE ENCOUNTER
Reason for Disposition   [1] Constant abdominal pain AND [2] present > 2 hours    Answer Assessment - Initial Assessment Questions  1  AMOUNT: "Describe the bleeding that you are having "     - SPOTTING: spotting, or pinkish / brownish mucous discharge; does not fill panti-liner or pad     - MILD:  less than 1 pad / hour; less than patient's usual menstrual bleeding    - MODERATE: 1-2 pads / hour; 1 menstrual cup every 6 hours; small-medium blood clots (e g , pea, grape, small coin)    - SEVERE: soaking 2 or more pads/hour for 2 or more hours; 1 menstrual cup every 2 hours; bleeding not contained by pads or continuous red blood from vagina; large blood clots (e g , golf ball, large coin)       Spotting    2  ONSET: "When did the bleeding begin?" "Is it continuing now?"      Started today    3  MENSTRUAL PERIOD: "When was the last normal menstrual period?" "How is this different than your period?"      4/16 lasted 10 days    4  REGULARITY: "How regular are your periods?"      Typically irregular but not to this extent    5  ABDOMINAL PAIN: "Do you have any pain?" "How bad is the pain?"  (e g , Scale 1-10; mild, moderate, or severe)    - MILD (1-3): doesn't interfere with normal activities, abdomen soft and not tender to touch     - MODERATE (4-7): interferes with normal activities or awakens from sleep, tender to touch     - SEVERE (8-10): excruciating pain, doubled over, unable to do any normal activities       7/10 "only feels comfortable if i'm in a ball, feels a sharp pain that is constantly turning all over stomach"    6  PREGNANCY: "Could you be pregnant?" "Are you sexually active?" "Did you recently give birth?"      Denies    7  BREASTFEEDING: "Are you breastfeeding?"      Denies    8  HORMONES: "Are you taking any hormone medications, prescription or OTC?" (e g , birth control pills, estrogen)      Nexplanon    9   BLOOD THINNERS: "Do you take any blood thinners?" (e g , Coumadin/warfarin, Pradaxa/dabigatran, aspirin)      Denies    10  CAUSE: "What do you think is causing the bleeding?" (e g , recent gyn surgery, recent gyn procedure; known bleeding disorder, cervical cancer, polycystic ovarian disease, fibroids)          Unknown    11  HEMODYNAMIC STATUS: "Are you weak or feeling lightheaded?" If Yes, ask: "Can you stand and walk normally?"         Noted feeling dizzy yesterday during bm     12  OTHER SYMPTOMS: "What other symptoms are you having with the bleeding?" (e g , passed tissue, vaginal discharge, fever, menstrual-type cramps)        Denies, fever   Admits to having sore nipples, nausea, diarrhea/constipation    Protocols used: VAGINAL BLEEDING - ABNORMAL-ADULT-

## 2022-05-01 NOTE — DISCHARGE INSTRUCTIONS
Take bentyl and naproxen for pain, also recommend vitamin with iron to help with anemia   Please make appointment with GYN and primary care for recheck

## 2022-05-01 NOTE — ED PROVIDER NOTES
History  Chief Complaint   Patient presents with    Vaginal Bleeding     last menstrual cycle ended on , started bleeding again today along with cramping and nausea, pt is 10 months post partum (Delivered twins)     HPI  22 yo F presents with vaginal bleeding  Has had menstrual period ending  and restarting today with cramping  States she has had these issues for months, called today to get appointment with GYN who recommended that she go to the ED  Denies vaginal discharge, fevers, dysuria  Prior to Admission Medications   Prescriptions Last Dose Informant Patient Reported? Taking? albuterol (ProAir HFA) 90 mcg/act inhaler  Self No No   Sig: Inhale 2 puffs every 6 (six) hours as needed for wheezing   clonazePAM (KlonoPIN) 2 mg tablet  Self Yes No   Sig: Place 2 mg under the tongue as needed for seizures   Patient not taking: Reported on 3/31/2022    etonogestrel (NEXPLANON) subdermal implant  Self Yes No   Si mg by Subdermal route once   fluticasone-salmeterol (Advair HFA) 115-21 MCG/ACT inhaler   No No   Sig: Inhale 2 puffs 2 (two) times a day Rinse mouth after use     lamoTRIgine  MG TB24  Self Yes No   Sig: Take 1 tablet by mouth 2 (two) times a day   zonisamide (ZONEGRAN) 100 mg capsule  Self Yes No   Si mg 2 (two) times a day       Facility-Administered Medications: None       Past Medical History:   Diagnosis Date    Anxiety     Autism     Depression     Seasonal allergies     Seizures (HCC)     last was a year ago    Sensory integration disorder     Tendonitis        Past Surgical History:   Procedure Laterality Date     SECTION      CHOLECYSTECTOMY      10/20    NO PAST SURGERIES      [place] LAP,CHOLECYSTECTOMY N/A 10/15/2020    Procedure: CHOLECYSTECTOMY LAPAROSCOPIC;  Surgeon: Minerva Low MD;  Location: [address] [place] [address];  Service: General       Family History   Problem Relation Age of Onset    Thyroid cancer Mother     Hiatal hernia Mother     Asthma Mother    [facility] Diabetes Family     Heart disease Family     Hypertension Family     Esophageal cancer Maternal Grandmother     Diabetes Maternal Grandfather      I have reviewed and agree with the history as documented  E-Cigarette/Vaping    E-Cigarette Use Never User      E-Cigarette/Vaping Substances    Nicotine No     THC No     CBD No     Flavoring No      Social History     Tobacco Use    Smoking status: Never Smoker    Smokeless tobacco: Never Used   Vaping Use    Vaping Use: Never used   Substance Use Topics    Alcohol use: No    Drug use: No       Review of Systems   Constitutional: Negative for chills and fever  HENT: Negative for dental problem and ear pain  Eyes: Negative for pain and redness  Respiratory: Negative for cough and shortness of breath  Cardiovascular: Negative for chest pain and palpitations  Gastrointestinal: Positive for nausea  Negative for abdominal pain  Endocrine: Negative for polydipsia and polyphagia  Genitourinary: Positive for vaginal bleeding  Negative for dysuria and frequency  Musculoskeletal: Negative for arthralgias and joint swelling  Skin: Negative for color change and rash  Neurological: Negative for dizziness and headaches  Psychiatric/Behavioral: Negative for behavioral problems and confusion  All other systems reviewed and are negative  Physical Exam  Physical Exam  Vitals and nursing note reviewed  Constitutional:       General: She is not in acute distress  Appearance: She is well-developed  She is not diaphoretic  HENT:      Head: Atraumatic  Right Ear: External ear normal       Left Ear: External ear normal       Nose: Nose normal    Eyes:      Conjunctiva/sclera: Conjunctivae normal       Pupils: Pupils are equal, round, and reactive to light  Neck:      Vascular: No JVD  Cardiovascular:      Rate and Rhythm: Normal rate and regular rhythm  Heart sounds: Normal heart sounds  No murmur heard        Pulmonary: Effort: Pulmonary effort is normal  No respiratory distress  Breath sounds: Normal breath sounds  No wheezing  Abdominal:      General: Bowel sounds are normal  There is no distension  Palpations: Abdomen is soft  Tenderness: There is no abdominal tenderness  Musculoskeletal:         General: Normal range of motion  Cervical back: Normal range of motion and neck supple  Skin:     General: Skin is warm and dry  Capillary Refill: Capillary refill takes less than 2 seconds  Neurological:      Mental Status: She is alert and oriented to person, place, and time  Cranial Nerves: No cranial nerve deficit     Psychiatric:         Behavior: Behavior normal          Vital Signs  ED Triage Vitals   Temperature Pulse Respirations Blood Pressure SpO2   04/30/22 2009 04/30/22 2009 04/30/22 2009 04/30/22 2009 04/30/22 2009   99 1 °F (37 3 °C) 92 21 135/88 98 %      Temp Source Heart Rate Source Patient Position - Orthostatic VS BP Location FiO2 (%)   04/30/22 2009 04/30/22 2009 04/30/22 2009 04/30/22 2009 --   Oral Monitor Sitting Left arm       Pain Score       04/30/22 2111       8           Vitals:    04/30/22 2009   BP: 135/88   Pulse: 92   Patient Position - Orthostatic VS: Sitting         Visual Acuity      ED Medications  Medications   dicyclomine (BENTYL) tablet 20 mg (20 mg Oral Given 4/30/22 2111)   naproxen (NAPROSYN) tablet 500 mg (500 mg Oral Given 4/30/22 2111)       Diagnostic Studies  Results Reviewed     Procedure Component Value Units Date/Time    Urine Microscopic [[id number]]  (Abnormal) Collected: 04/30/22 2148    Lab Status: Final result Specimen: Urine, Clean Catch Updated: 04/30/22 2207     RBC, UA 4-10 /hpf      WBC, UA 2-4 /hpf      Epithelial Cells Occasional /hpf      Bacteria, UA None Seen /hpf      AMORPH PHOSPATES Innumerable /hpf     UA w Reflex to Microscopic w Reflex to Culture [[id number]]  (Abnormal) Collected: 04/30/22 2148    Lab Status: Final result Specimen: Urine, Clean Catch Updated: 04/30/22 2156     Color, UA Yellow     Clarity, UA Turbid     Specific [place], UA 1 020     pH, UA 8 5     Leukocytes, UA Negative     Nitrite, UA Negative     Protein, UA Negative mg/dl      Glucose, UA Negative mg/dl      Ketones, UA Negative mg/dl      Urobilinogen, UA 0 2 E U /dl      Bilirubin, UA Negative     Blood, UA Large    hCG, qualitative pregnancy [[id number]]  (Normal) Collected: 04/30/22 2110    Lab Status: Final result Specimen: Blood from Arm, Left Updated: 04/30/22 2142     Preg, Serum Negative    Comprehensive metabolic panel [[id number]]  (Abnormal) Collected: 04/30/22 2110    Lab Status: Final result Specimen: Blood from Arm, Left Updated: 04/30/22 2139     Sodium 140 mmol/L      Potassium 3 8 mmol/L      Chloride 106 mmol/L      CO2 25 mmol/L      ANION GAP 9 mmol/L      BUN 14 mg/dL      Creatinine 1 11 mg/dL      Glucose 93 mg/dL      Calcium 8 8 mg/dL      AST 11 U/L      ALT 26 U/L      Alkaline Phosphatase 130 U/L      Total Protein 7 5 g/dL      Albumin 3 6 g/dL      Total Bilirubin 0 12 mg/dL      eGFR 71 ml/min/1 73sq m     Narrative:      [facility] [place] guidelines for Chronic Kidney Disease (CKD):     Stage 1 with normal or high GFR (GFR > 90 mL/min/1 73 square meters)    Stage 2 Mild CKD (GFR = 60-89 mL/min/1 73 square meters)    Stage 3A Moderate CKD (GFR = 45-59 mL/min/1 73 square meters)    Stage 3B Moderate CKD (GFR = 30-44 mL/min/1 73 square meters)    Stage 4 Severe CKD (GFR = 15-29 mL/min/1 73 square meters)    Stage 5 End Stage CKD (GFR <15 mL/min/1 73 square meters)  Note: GFR calculation is accurate only with a steady state creatinine    CBC and differential [[id number]]  (Abnormal) Collected: 04/30/22 2110    Lab Status: Final result Specimen: Blood from Arm, Left Updated: 04/30/22 2117     WBC 10 07 Thousand/uL      RBC 4 61 Million/uL      Hemoglobin 11 1 g/dL      Hematocrit 36 5 %      MCV 79 fL      MCH 24 1 pg      MCHC 30 4 g/dL      RDW 15 7 %      MPV 9 5 fL      Platelets 294 Thousands/uL      nRBC 0 /100 WBCs      Neutrophils Relative 68 %      Immat GRANS % 0 %      Lymphocytes Relative 25 %      Monocytes Relative 5 %      Eosinophils Relative 1 %      Basophils Relative 1 %      Neutrophils Absolute 6 83 Thousands/µL      Immature Grans Absolute 0 02 Thousand/uL      Lymphocytes Absolute 2 51 Thousands/µL      Monocytes Absolute 0 51 Thousand/µL      Eosinophils Absolute 0 14 Thousand/µL      Basophils Absolute 0 06 Thousands/µL                  No orders to display              Procedures  Procedures         ED Course                                             MDM  Patient presents with abdominal cramping and vaginal bleeding  Labs show stable anemia  Preg negative  Discussed naproxen, bentyl and follow up with GYN  Disposition  Final diagnoses:   Abnormal uterine bleeding   Abdominal cramping     Time reflects when diagnosis was documented in both MDM as applicable and the Disposition within this note     Time User Action Codes Description Comment    4/30/2022  9:49 PM Floy Sero Add [N93 9] Abnormal uterine bleeding     4/30/2022  9:52 PM Floy Sero Add [R10 9] Abdominal cramping       ED Disposition     ED Disposition Condition Date/Time Comment    Discharge Stable Sat Apr 30, 2022  9:49 PM Noretta Kida discharge to home/self care              Follow-up Information     Follow up With Specialties Details Why [address], [address], DO General Practice Call   [address]      Freedom Lugo MD Obstetrics and Gynecology, Obstetrics, Gynecology Call  for GYN follow up [facility] [phone]            Patient's Medications   Discharge Prescriptions    DICYCLOMINE (BENTYL) 20 MG TABLET    Take 1 tablet (20 mg total) by mouth 2 (two) times a day       Start Date: 4/30/2022 End Date: --       Order Dose: 20 mg       Quantity: 20 tablet    Refills: 0    NAPROXEN (NAPROSYN) 500 MG TABLET    Take 1 tablet (500 mg total) by mouth 2 (two) times a day with meals       Start Date: 4/30/2022 End Date: --       Order Dose: 500 mg       Quantity: 30 tablet    Refills: 0       No discharge procedures on file      PDMP Review     None          ED Provider  Electronically Signed by           Roe Cuadra MD  04/30/22 4911

## 2022-05-27 ENCOUNTER — TELEPHONE (OUTPATIENT)
Dept: PULMONOLOGY | Facility: CLINIC | Age: 22
End: 2022-05-27

## 2022-06-22 ENCOUNTER — APPOINTMENT (OUTPATIENT)
Dept: LAB | Facility: MEDICAL CENTER | Age: 22
End: 2022-06-22
Payer: COMMERCIAL

## 2022-06-22 ENCOUNTER — HOSPITAL ENCOUNTER (OUTPATIENT)
Dept: RADIOLOGY | Facility: MEDICAL CENTER | Age: 22
Discharge: HOME | End: 2022-06-22
Payer: COMMERCIAL

## 2022-06-22 DIAGNOSIS — R10.2 PERINEAL PAIN IN FEMALE: ICD-10-CM

## 2022-06-22 DIAGNOSIS — Z11.59 ENCOUNTER FOR SCREENING FOR OTHER VIRAL DISEASES: ICD-10-CM

## 2022-06-22 LAB
HBV CORE AB SER QL: NORMAL
HBV CORE IGM SER QL: NORMAL
HBV SURFACE AG SER QL: NORMAL
HCV AB SER QL: NORMAL

## 2022-06-22 PROCEDURE — 76856 US EXAM PELVIC COMPLETE: CPT

## 2022-06-22 PROCEDURE — 86705 HEP B CORE ANTIBODY IGM: CPT

## 2022-06-22 PROCEDURE — 86592 SYPHILIS TEST NON-TREP QUAL: CPT

## 2022-06-22 PROCEDURE — 87340 HEPATITIS B SURFACE AG IA: CPT

## 2022-06-22 PROCEDURE — 36415 COLL VENOUS BLD VENIPUNCTURE: CPT

## 2022-06-22 PROCEDURE — 86803 HEPATITIS C AB TEST: CPT

## 2022-06-22 PROCEDURE — 76830 TRANSVAGINAL US NON-OB: CPT

## 2022-06-22 PROCEDURE — 86704 HEP B CORE ANTIBODY TOTAL: CPT

## 2022-06-22 PROCEDURE — 87389 HIV-1 AG W/HIV-1&-2 AB AG IA: CPT

## 2022-06-23 LAB
HIV 1+2 AB+HIV1 P24 AG SERPL QL IA: NORMAL
RPR SER QL: NORMAL

## 2023-03-04 ENCOUNTER — APPOINTMENT (OUTPATIENT)
Dept: LAB | Facility: MEDICAL CENTER | Age: 23
End: 2023-03-04

## 2023-03-04 DIAGNOSIS — M25.50 PAIN IN JOINT, MULTIPLE SITES: ICD-10-CM

## 2023-03-04 DIAGNOSIS — R79.9 ABNORMAL BLOOD CHEMISTRY: ICD-10-CM

## 2023-03-04 DIAGNOSIS — R53.81 DEBILITY: ICD-10-CM

## 2023-03-04 LAB
25(OH)D3 SERPL-MCNC: 19.8 NG/ML (ref 30–100)
ALBUMIN SERPL BCP-MCNC: 3.4 G/DL (ref 3.5–5)
ALP SERPL-CCNC: 93 U/L (ref 46–116)
ALT SERPL W P-5'-P-CCNC: 29 U/L (ref 12–78)
ANION GAP SERPL CALCULATED.3IONS-SCNC: 3 MMOL/L (ref 4–13)
AST SERPL W P-5'-P-CCNC: 16 U/L (ref 5–45)
BASOPHILS # BLD AUTO: 0.03 THOUSANDS/ÂΜL (ref 0–0.1)
BASOPHILS NFR BLD AUTO: 0 % (ref 0–1)
BILIRUB SERPL-MCNC: 0.49 MG/DL (ref 0.2–1)
BUN SERPL-MCNC: 11 MG/DL (ref 5–25)
CALCIUM ALBUM COR SERPL-MCNC: 9.8 MG/DL (ref 8.3–10.1)
CALCIUM SERPL-MCNC: 9.3 MG/DL (ref 8.3–10.1)
CHLORIDE SERPL-SCNC: 108 MMOL/L (ref 96–108)
CHOLEST SERPL-MCNC: 156 MG/DL
CO2 SERPL-SCNC: 26 MMOL/L (ref 21–32)
CREAT SERPL-MCNC: 0.67 MG/DL (ref 0.6–1.3)
EOSINOPHIL # BLD AUTO: 0.2 THOUSAND/ÂΜL (ref 0–0.61)
EOSINOPHIL NFR BLD AUTO: 2 % (ref 0–6)
ERYTHROCYTE [DISTWIDTH] IN BLOOD BY AUTOMATED COUNT: 14.7 % (ref 11.6–15.1)
GFR SERPL CREATININE-BSD FRML MDRD: 126 ML/MIN/1.73SQ M
GLUCOSE P FAST SERPL-MCNC: 102 MG/DL (ref 65–99)
HCT VFR BLD AUTO: 36.9 % (ref 34.8–46.1)
HDLC SERPL-MCNC: 38 MG/DL
HGB BLD-MCNC: 11.6 G/DL (ref 11.5–15.4)
IMM GRANULOCYTES # BLD AUTO: 0.02 THOUSAND/UL (ref 0–0.2)
IMM GRANULOCYTES NFR BLD AUTO: 0 % (ref 0–2)
LDLC SERPL CALC-MCNC: 100 MG/DL (ref 0–100)
LYMPHOCYTES # BLD AUTO: 1.83 THOUSANDS/ÂΜL (ref 0.6–4.47)
LYMPHOCYTES NFR BLD AUTO: 20 % (ref 14–44)
MCH RBC QN AUTO: 24.9 PG (ref 26.8–34.3)
MCHC RBC AUTO-ENTMCNC: 31.4 G/DL (ref 31.4–37.4)
MCV RBC AUTO: 79 FL (ref 82–98)
MONOCYTES # BLD AUTO: 0.4 THOUSAND/ÂΜL (ref 0.17–1.22)
MONOCYTES NFR BLD AUTO: 4 % (ref 4–12)
NEUTROPHILS # BLD AUTO: 6.63 THOUSANDS/ÂΜL (ref 1.85–7.62)
NEUTS SEG NFR BLD AUTO: 74 % (ref 43–75)
NONHDLC SERPL-MCNC: 118 MG/DL
NRBC BLD AUTO-RTO: 0 /100 WBCS
PLATELET # BLD AUTO: 369 THOUSANDS/UL (ref 149–390)
PMV BLD AUTO: 10.2 FL (ref 8.9–12.7)
POTASSIUM SERPL-SCNC: 4 MMOL/L (ref 3.5–5.3)
PROT SERPL-MCNC: 7.2 G/DL (ref 6.4–8.4)
RBC # BLD AUTO: 4.66 MILLION/UL (ref 3.81–5.12)
SODIUM SERPL-SCNC: 137 MMOL/L (ref 135–147)
T3 SERPL-MCNC: 1.4 NG/ML (ref 0.6–1.8)
T4 SERPL-MCNC: 9.7 UG/DL (ref 4.7–13.3)
TRIGL SERPL-MCNC: 92 MG/DL
TSH SERPL DL<=0.05 MIU/L-ACNC: 1.69 UIU/ML (ref 0.45–4.5)
URATE SERPL-MCNC: 5.7 MG/DL (ref 2–7.5)
WBC # BLD AUTO: 9.11 THOUSAND/UL (ref 4.31–10.16)

## 2023-03-09 LAB — F5 GENE MUT ANL BLD/T: NORMAL

## 2023-03-16 ENCOUNTER — ANNUAL EXAM (OUTPATIENT)
Dept: OBGYN CLINIC | Age: 23
End: 2023-03-16

## 2023-03-16 VITALS
BODY MASS INDEX: 41.02 KG/M2 | SYSTOLIC BLOOD PRESSURE: 128 MMHG | WEIGHT: 293 LBS | DIASTOLIC BLOOD PRESSURE: 76 MMHG | HEIGHT: 71 IN

## 2023-03-16 DIAGNOSIS — B37.49 CANDIDA INFECTION OF GENITAL REGION: ICD-10-CM

## 2023-03-16 DIAGNOSIS — Z23 NEED FOR HPV VACCINATION: ICD-10-CM

## 2023-03-16 DIAGNOSIS — Z01.419 ENCOUNTER FOR GYNECOLOGICAL EXAMINATION WITHOUT ABNORMAL FINDING: Primary | ICD-10-CM

## 2023-03-16 DIAGNOSIS — Z23 NEED FOR HPV VACCINE: ICD-10-CM

## 2023-03-16 DIAGNOSIS — N83.202 LEFT OVARIAN CYST: ICD-10-CM

## 2023-03-16 DIAGNOSIS — Z11.3 SCREEN FOR STD (SEXUALLY TRANSMITTED DISEASE): ICD-10-CM

## 2023-03-16 DIAGNOSIS — Z30.46 ENCOUNTER FOR SURVEILLANCE OF NEXPLANON SUBDERMAL CONTRACEPTIVE: ICD-10-CM

## 2023-03-16 RX ORDER — FLUCONAZOLE 150 MG/1
150 TABLET ORAL ONCE
Qty: 2 TABLET | Refills: 2 | Status: SHIPPED | OUTPATIENT
Start: 2023-03-16 | End: 2023-03-20

## 2023-03-16 NOTE — PROGRESS NOTES
Patient presents for:yearly     Menarche-   Last Pap Smear-none  Hx of abnormal paps-none  Birth control-Nexplanon, inserted 12/9/21    Mammogram- Not on file  DXA-  Colonoscopy-    Smoking status-  Last sexual activity-approx 2 months   Family history of uterine, ovarian, cervical or breast cancer-denies    Concerns-none, unsure about HPV, will discuss with mother      Pt requesting Gardasil vaccine, VIS given and reviewed with pt, vaccine admin IM , LD, tolerated well
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
## 2023-03-16 NOTE — PATIENT INSTRUCTIONS
Breast Self Exam for Women   AMBULATORY CARE:   A breast self-exam (BSE)  is a way to check your breasts for lumps and other changes  Regular BSEs can help you know how your breasts normally look and feel  Most breast lumps or changes are not cancer, but you should always have them checked by a healthcare provider  Why you should do a BSE:  Breast cancer is the most common type of cancer in women  Even if you have mammograms, you may still want to do a BSE regularly  If you know how your breasts normally feel and look, it may help you know when to contact your healthcare provider  Mammograms can miss some cancers  You may find a lump during a BSE that did not show up on a mammogram   When you should do a BSE:  If you have periods, you may want to do your BSE 1 week after your period ends  This is the time when your breasts may be the least swollen, lumpy, or tender  You can do regular BSEs even if you are breastfeeding or have breast implants  Call your doctor if:   You find any lumps or changes in your breasts  You have breast pain or fluid coming from your nipples  You have questions or concerns about your condition or care  How to do a BSE:       Look at your breasts in a mirror  Look at the size and shape of each breast and nipple  Check for swelling, lumps, dimpling, scaly skin, or other skin changes  Look for nipple changes, such as a nipple that is painful or beginning to pull inward  Gently squeeze both nipples and check to see if fluid (that is not breast milk) comes out of them  If you find any of these or other breast changes, contact your healthcare provider  Check your breasts while you sit or  the following 3 positions:    [place] your arms down at your sides  Raise your hands and join them behind your head  Put firm pressure with your hands on your hips  Bend slightly forward while you look at your breasts in the mirror  Lie down and feel your breasts    When you lie down, your breast tissue spreads out evenly over your chest  This makes it easier for you to feel for lumps and anything that may not be normal for your breasts  Do a BSE on one breast at a time  Place a small pillow or towel under your left shoulder  Put your left arm behind your head  Use the 3 middle fingers of your right hand  Use your fingertip pads, on the top of your fingers  Your fingertip pad is the most sensitive part of your finger  Use small circles to feel your breast tissue  Use your fingertip pads to make dime-sized, overlapping circles on your breast and armpits  Use light, medium, and firm pressure  First, press lightly  Second, press with medium pressure to feel a little deeper into the breast  Last, use firm pressure to feel deep within your breast     Examine your entire breast area  Examine the breast area from above the breast to below the breast where you feel only ribs  Make small circles with your fingertips, starting in the middle of your armpit  Make circles going up and down the breast area  Continue toward your breast and all the way across it  Examine the area from your armpit all the way over to the middle of your chest (breastbone)  Stop at the middle of your chest     Move the pillow or towel to your right shoulder, and put your right arm behind your head  Use the 3 fingertip pads of your left hand, and repeat the above steps to do a BSE on your right breast   What else you can do to check for breast problems or cancer:  Talk to your healthcare provider about mammograms  A mammogram is an x-ray of your breasts to screen for breast cancer or other problems  Your provider can tell you the benefits and risks of mammograms  The first mammogram is usually at age 39 or 48  Your provider may recommend you start at 36 or younger if your risk for breast cancer is high  Mammograms usually continue every 1 to 2 years until age 76         Follow up with your doctor as directed:  Write down your questions so you remember to ask them during your visits  © Copyright [employer] 2022 Information is for End User's use only and may not be sold, redistributed or otherwise used for commercial purposes  The above information is an  only  It is not intended as medical advice for individual conditions or treatments  Talk to your doctor, nurse or pharmacist before following any medical regimen to see if it is safe and effective for you

## 2023-05-03 DIAGNOSIS — J45.30 MILD PERSISTENT REACTIVE AIRWAY DISEASE WITHOUT COMPLICATION: ICD-10-CM

## 2023-05-03 RX ORDER — FLUTICASONE PROPIONATE AND SALMETEROL XINAFOATE 115; 21 UG/1; UG/1
2 AEROSOL, METERED RESPIRATORY (INHALATION) 2 TIMES DAILY
OUTPATIENT
Start: 2023-05-03

## 2023-05-16 ENCOUNTER — CLINICAL SUPPORT (OUTPATIENT)
Age: 23
End: 2023-05-16

## 2023-05-16 VITALS — HEIGHT: 71 IN | BODY MASS INDEX: 41.02 KG/M2 | WEIGHT: 293 LBS

## 2023-05-16 DIAGNOSIS — Z23 NEED FOR HPV VACCINE: Primary | ICD-10-CM

## 2023-05-16 NOTE — PATIENT INSTRUCTIONS
HPV (Human Papillomavirus) Vaccine for Adults   AMBULATORY CARE:   The human papillomavirus (HPV) vaccine  is an injection given to females and males to protect against human papillomavirus infection  HPV is most commonly spread through sexual activity  It can also be spread from a mother to her baby during delivery  The HPV vaccine is most effective if given before sexual activity begins  This allows your body to build almost complete protection against HPV before you have contact with the virus  The HPV vaccine is still effective after sexual activity has begun  How the vaccine is given:  The HPV vaccine can be given with other vaccines  The vaccine is given in 2 or 3 doses through age 32: The first dose  is given at any time  The second dose  is given 1 to 2 months after the first dose  The third dose, if needed,  is given 6 months after the first dose  Reasons you should not get the HPV vaccine, or should wait to get it: You had a severe allergic reaction to a dose of the vaccine  You are pregnant  Your healthcare provider will tell you when you can get the vaccine  You are sick or have a fever  You may need to wait to get the vaccine until symptoms go away  Risks of the HPV vaccine: You may have pain, redness, or swelling where the shot was given  You may have a fever or headache  You may have an allergic reaction to the vaccine  This can be life-threatening  Call your local emergency number (911 in the [address] [facility]) if:   You have signs of a severe allergic reaction, such as trouble breathing, hives, or wheezing  Seek care immediately if:   You have a high fever or behavior changes that concern you  Call your doctor if:   You have questions or concerns about the HPV vaccine  Apply a warm compress  to the area to relieve swelling and pain  Follow up with your doctor as directed:  Write down your questions so you remember to ask them during your visits    © Copyright [employer] 2022 Information is for End User's use only and may not be sold, redistributed or otherwise used for commercial purposes  The above information is an  only  It is not intended as medical advice for individual conditions or treatments  Talk to your doctor, nurse or pharmacist before following any medical regimen to see if it is safe and effective for you

## 2023-07-06 ENCOUNTER — APPOINTMENT (OUTPATIENT)
Dept: LAB | Facility: MEDICAL CENTER | Age: 23
End: 2023-07-06
Payer: COMMERCIAL

## 2023-07-06 DIAGNOSIS — E55.9 VITAMIN D DEFICIENCY: ICD-10-CM

## 2023-07-06 LAB — 25(OH)D3 SERPL-MCNC: 12.6 NG/ML (ref 30–100)

## 2023-07-06 PROCEDURE — 82306 VITAMIN D 25 HYDROXY: CPT

## 2023-07-06 PROCEDURE — 36415 COLL VENOUS BLD VENIPUNCTURE: CPT

## 2023-08-10 ENCOUNTER — OFFICE VISIT (OUTPATIENT)
Dept: URGENT CARE | Facility: MEDICAL CENTER | Age: 23
End: 2023-08-10
Payer: COMMERCIAL

## 2023-08-10 VITALS
OXYGEN SATURATION: 98 % | RESPIRATION RATE: 18 BRPM | WEIGHT: 293 LBS | HEIGHT: 72 IN | SYSTOLIC BLOOD PRESSURE: 134 MMHG | HEART RATE: 94 BPM | BODY MASS INDEX: 39.68 KG/M2 | TEMPERATURE: 97.8 F | DIASTOLIC BLOOD PRESSURE: 84 MMHG

## 2023-08-10 DIAGNOSIS — L03.116 CELLULITIS OF LEFT LEG: Primary | ICD-10-CM

## 2023-08-10 PROCEDURE — 99213 OFFICE O/P EST LOW 20 MIN: CPT | Performed by: PHYSICIAN ASSISTANT

## 2023-08-10 RX ORDER — CEPHALEXIN 500 MG/1
500 CAPSULE ORAL EVERY 6 HOURS SCHEDULED
Qty: 40 CAPSULE | Refills: 0 | Status: SHIPPED | OUTPATIENT
Start: 2023-08-10 | End: 2023-08-20

## 2023-08-10 NOTE — PATIENT INSTRUCTIONS
1.  Over-the-counter ibuprofen and or acetaminophen as needed for pain or fever. 2.  Apply warm compresses and elevate the left leg 4 times daily for 20 to 30 minutes until improved. 3.  Go to the ER immediately if symptoms worsen or if they do not improve within 48 to 72 hours of antibiotics. 4.  Follow-up with primary care in 7 to 10 days for any persistent symptoms.

## 2023-08-10 NOTE — PROGRESS NOTES
[facility]        NAME: Ivania Correia is a 26 y.o. female  : 2000    MRN: [id number]  DATE: August 10, 2023  TIME: 5:55 PM    Assessment and Plan   Cellulitis of left leg [L03.116]  1. Cellulitis of left leg  cephalexin (KEFLEX) 500 mg capsule            Patient Instructions     1. Over-the-counter ibuprofen and or acetaminophen as needed for pain or fever. 2.  Apply warm compresses and elevate the left leg 4 times daily for 20 to 30 minutes until improved. 3.  Go to the ER immediately if symptoms worsen or if they do not improve within 48 to 72 hours of antibiotics. 4.  Follow-up with primary care in 7 to 10 days for any persistent symptoms. Chief Complaint     Chief Complaint   Patient presents with   • Rash     Pt. With a rash to her left lower leg for the past two days          History of Present Illness       61-year-old female patient with a 2-day history of worsening left lower leg redness, swelling, tenderness, warmth. Patient knows of no known injuries, insect bites, etc.  Patient states there is mild itch to the area as well but the pain is much worse than the itch. Denies any calf swelling or pain. Denies any distal paresthesias. No shortness of breath or throat swelling. No fever, chills, body aches otherwise. No nausea or vomiting. Review of Systems   Review of Systems   Constitutional: Negative for chills and fever. HENT: Negative for ear pain and sore throat. Eyes: Negative for pain and visual disturbance. Respiratory: Negative for cough and shortness of breath. Cardiovascular: Negative for chest pain and palpitations. Gastrointestinal: Negative for abdominal pain and vomiting. Genitourinary: Negative for dysuria and hematuria. Musculoskeletal: Negative for arthralgias and back pain. Skin: Positive for color change and rash. Negative for wound. Neurological: Negative for seizures and syncope.    All other systems reviewed and are negative.         Current Medications       Current Outpatient Medications:   •  Advair -21 MCG/ACT inhaler, INHALE 2 PUFFS 2 (TWO) TIMES A DAY RINSE MOUTH AFTER USE., Disp: 12 g, Rfl: 0  •  albuterol (ProAir HFA) 90 mcg/act inhaler, Inhale 2 puffs every 6 (six) hours as needed for wheezing, Disp: 8.5 g, Rfl: 0  •  cephalexin (KEFLEX) 500 mg capsule, Take 1 capsule (500 mg total) by mouth every 6 (six) hours for 10 days, Disp: 40 capsule, Rfl: 0  •  clonazePAM (KlonoPIN) 2 mg tablet, Place 2 mg under the tongue as needed for seizures, Disp: , Rfl:   •  etonogestrel (NEXPLANON) subdermal implant, 68 mg by Subdermal route once, Disp: , Rfl:   •  lamoTRIgine  MG TB24, Take 1 tablet by mouth 2 (two) times a day, Disp: , Rfl:   •  naproxen (NAPROSYN) 500 mg tablet, Take 1 tablet (500 mg total) by mouth 2 (two) times a day with meals, Disp: 30 tablet, Rfl: 0  •  zonisamide (ZONEGRAN) 100 mg capsule, 100 mg 2 (two) times a day , Disp: , Rfl:     Current Allergies     Allergies as of 08/10/2023 - Reviewed 08/10/2023   Allergen Reaction Noted   • Cetirizine hcl  11/15/2018   • Klonopin [clonazepam] Other (See Comments) 2020            The following portions of the patient's history were reviewed and updated as appropriate: allergies, current medications, past family history, past medical history, past social history, past surgical history and problem list.     Past Medical History:   Diagnosis Date   • Anxiety    • Autism    • Depression    • Epilepsy ([address])    • Hypertension    • Ovarian cyst    • Seasonal allergies    • Seizures ([address])     last was a year ago   • Sensory integration disorder    • Tendonitis        Past Surgical History:   Procedure Laterality Date   •  SECTION     • CHOLECYSTECTOMY      10/20   • NO PAST SURGERIES     • [place] LAPAROSCOPY SURG CHOLECYSTECTOMY N/A 10/15/2020    Procedure: CHOLECYSTECTOMY LAPAROSCOPIC;  Surgeon: Subha Gray MD;  Location: [address] [place] [address];  Service: General Family History   Problem Relation Age of Onset   • Thyroid cancer Mother    • Hiatal hernia Mother    • Asthma Mother    • Factor V Leiden deficiency Mother    • Cancer Mother    • Esophageal cancer Maternal Grandmother    • Diabetes Maternal Grandfather    • Heart failure Maternal Grandfather    • Hypertension Maternal Grandfather    • Diabetes Family    • Heart disease Family    • Hypertension Family          Medications have been verified. Objective   /84   Pulse 94   Temp 97.8 °F (36.6 °C)   Resp 18   Ht 6' 1" (1.854 m)   Wt (!) 163 kg (360 lb)   SpO2 98%   BMI 47.50 kg/m²        Physical Exam     Physical Exam  Constitutional:       General: She is not in acute distress. Appearance: Normal appearance. She is not ill-appearing. HENT:      Head: Normocephalic. Nose: Nose normal.      Mouth/Throat:      Mouth: Mucous membranes are moist.      Pharynx: Oropharynx is clear. Eyes:      Conjunctiva/sclera: Conjunctivae normal.      Pupils: Pupils are equal, round, and reactive to light. Cardiovascular:      Rate and Rhythm: Normal rate. Pulmonary:      Effort: Pulmonary effort is normal.      Breath sounds: Normal breath sounds. Abdominal:      Tenderness: There is no abdominal tenderness. Musculoskeletal:         General: Normal range of motion. Cervical back: Normal range of motion. Skin:     Capillary Refill: Capillary refill takes less than 2 seconds. Comments: Distal two thirds of left lower leg just above the ankle red, warm, tender to touch. Subtle swelling noted. Calf nontender, Homans' sign negative. Distal neurovascular exam is intact. No skin injury seen. No red streaking. Neurological:      Mental Status: She is alert and oriented to person, place, and time.    Psychiatric:         Mood and Affect: Mood normal.         Behavior: Behavior normal.

## 2023-09-19 ENCOUNTER — CLINICAL SUPPORT (OUTPATIENT)
Age: 23
End: 2023-09-19
Payer: COMMERCIAL

## 2023-09-19 VITALS — WEIGHT: 293 LBS | HEIGHT: 72 IN | BODY MASS INDEX: 39.68 KG/M2

## 2023-09-19 DIAGNOSIS — Z23 NEED FOR HPV VACCINE: Primary | ICD-10-CM

## 2023-09-19 PROBLEM — E66.01 OBESITY, MORBID, BMI 40.0-49.9 (HCC): Status: ACTIVE | Noted: 2023-08-12

## 2023-09-19 PROCEDURE — 90651 9VHPV VACCINE 2/3 DOSE IM: CPT

## 2023-09-19 PROCEDURE — 90471 IMMUNIZATION ADMIN: CPT

## 2023-09-19 NOTE — PATIENT INSTRUCTIONS
HPV (Human Papillomavirus) Vaccine for Adults   AMBULATORY CARE:   The human papillomavirus (HPV) vaccine  is an injection given to females and males to protect against human papillomavirus infection. HPV is most commonly spread through sexual activity. It can also be spread from a mother to her baby during delivery. The HPV vaccine is most effective if given before sexual activity begins. This allows your body to build almost complete protection against HPV before you have contact with the virus. The HPV vaccine is still effective after sexual activity has begun. How the vaccine is given:  The HPV vaccine can be given with other vaccines. The vaccine is given in 2 or 3 doses through age 32: The first dose  is given at any time. The second dose  is given 1 to 2 months after the first dose. The third dose, if needed,  is given 6 months after the first dose. Reasons you should not get the HPV vaccine, or should wait to get it: You had a severe allergic reaction to a dose of the vaccine. You are pregnant. Your healthcare provider will tell you when you can get the vaccine. You are sick or have a fever. You may need to wait to get the vaccine until symptoms go away. Risks of the HPV vaccine: You may have pain, redness, or swelling where the shot was given. You may have a fever or headache. You may have an allergic reaction to the vaccine. This can be life-threatening. Call your local emergency number (911 in the [address]) if:   You have signs of a severe allergic reaction, such as trouble breathing, hives, or wheezing. Seek care immediately if:   You have a high fever or behavior changes that concern you. Call your doctor if:   You have questions or concerns about the HPV vaccine. Apply a warm compress  to the area to relieve swelling and pain. Follow up with your doctor as directed:  Write down your questions so you remember to ask them during your visits.   © Copyright [employer] 2022 Information is for End User's use only and may not be sold, redistributed or otherwise used for commercial purposes. The above information is an  only. It is not intended as medical advice for individual conditions or treatments. Talk to your doctor, nurse or pharmacist before following any medical regimen to see if it is safe and effective for you.

## 2024-01-18 ENCOUNTER — OFFICE VISIT (OUTPATIENT)
Dept: URGENT CARE | Facility: MEDICAL CENTER | Age: 24
End: 2024-01-18
Payer: COMMERCIAL

## 2024-01-18 VITALS
SYSTOLIC BLOOD PRESSURE: 126 MMHG | DIASTOLIC BLOOD PRESSURE: 84 MMHG | HEIGHT: 72 IN | HEART RATE: 103 BPM | RESPIRATION RATE: 18 BRPM | OXYGEN SATURATION: 98 % | TEMPERATURE: 97.6 F | WEIGHT: 293 LBS | BODY MASS INDEX: 39.68 KG/M2

## 2024-01-18 DIAGNOSIS — J01.00 ACUTE MAXILLARY SINUSITIS, RECURRENCE NOT SPECIFIED: ICD-10-CM

## 2024-01-18 DIAGNOSIS — J45.21 MILD INTERMITTENT ASTHMATIC BRONCHITIS WITH ACUTE EXACERBATION: Primary | ICD-10-CM

## 2024-01-18 PROCEDURE — 99213 OFFICE O/P EST LOW 20 MIN: CPT | Performed by: PHYSICIAN ASSISTANT

## 2024-01-18 RX ORDER — ALBUTEROL SULFATE 90 UG/1
2 AEROSOL, METERED RESPIRATORY (INHALATION) EVERY 6 HOURS PRN
Qty: 18 G | Refills: 0 | Status: SHIPPED | OUTPATIENT
Start: 2024-01-18 | End: 2024-01-25

## 2024-01-18 RX ORDER — OMEGA-3S/DHA/EPA/FISH OIL/D3 300MG-1000
400 CAPSULE ORAL DAILY
COMMUNITY

## 2024-01-18 RX ORDER — PREDNISONE 20 MG/1
20 TABLET ORAL 2 TIMES DAILY WITH MEALS
Qty: 10 TABLET | Refills: 0 | Status: SHIPPED | OUTPATIENT
Start: 2024-01-18 | End: 2024-01-23

## 2024-01-18 RX ORDER — AZITHROMYCIN 250 MG/1
250 TABLET, FILM COATED ORAL EVERY 24 HOURS
Qty: 6 TABLET | Refills: 0 | Status: SHIPPED | OUTPATIENT
Start: 2024-01-18 | End: 2024-01-23

## 2024-01-18 NOTE — PROGRESS NOTES
[facility]        NAME: Mary Beth Knowles is a 23 y.o. female  : 2000    MRN: [id number]  DATE: 2024  TIME: 7:04 PM    Assessment and Plan   Mild intermittent asthmatic bronchitis with acute exacerbation [J45.21]  1. Mild intermittent asthmatic bronchitis with acute exacerbation  predniSONE 20 mg tablet    albuterol (Ventolin HFA) 90 mcg/act inhaler      2. Acute maxillary sinusitis, recurrence not specified  azithromycin (ZITHROMAX) 250 mg tablet            Patient Instructions     Increase fluids  Take Prednisone 20mg twice daily x 5 days  Use Albuterol 2 puffs every 4-6 hours as needed until cough free  Take Zithromax 2 tablets today, then 1 days 2-5  Follow-up with PCP if symptoms persist or worsen.       Chief Complaint     Chief Complaint   Patient presents with    Cough     Pt reports having productive cough with green/yellow phlegm x2 months. Pt also having fatigue and loss of appetite x1 week.          History of Present Illness       Mary Beth is a 23-year-old female who presents for evaluation of a cough that is been present for approximately 2 months.  Patient reports her initial symptoms started with a runny nose, cough and congestion.  She reports that nasal symptoms have improved but the cough has persisted and worsened.  Patient reports her cough is productive.  She denies any chest pain but has had shortness of breath.  Patient does have a prior history of reactive airway disease, she has been using albuterol with little improvement of symptoms.    Cough        Review of Systems   Review of Systems   Constitutional: Negative.    HENT:  Positive for congestion and sinus pressure.    Respiratory:  Positive for cough and chest tightness.    Cardiovascular: Negative.    Gastrointestinal: Negative.          Current Medications       Current Outpatient Medications:     Advair -21 MCG/ACT inhaler, INHALE 2 PUFFS 2 (TWO) TIMES A DAY RINSE MOUTH AFTER USE., Disp: 12  g, Rfl: 0    albuterol (ProAir HFA) 90 mcg/act inhaler, Inhale 2 puffs every 6 (six) hours as needed for wheezing, Disp: 8.5 g, Rfl: 0    albuterol (Ventolin HFA) 90 mcg/act inhaler, Inhale 2 puffs every 6 (six) hours as needed for shortness of breath for up to 7 days, Disp: 18 g, Rfl: 0    azithromycin (ZITHROMAX) 250 mg tablet, Take 1 tablet (250 mg total) by mouth every 24 hours for 5 days Take 2 day 1, then 1 days 2-5 (z-pack), Disp: 6 tablet, Rfl: 0    cholecalciferol (VITAMIN D3) 400 units tablet, Take 400 Units by mouth daily, Disp: , Rfl:     clonazePAM (KlonoPIN) 2 mg tablet, Place 2 mg under the tongue as needed for seizures, Disp: , Rfl:     etonogestrel (NEXPLANON) subdermal implant, 68 mg by Subdermal route once, Disp: , Rfl:     lamoTRIgine  MG TB24, Take 1 tablet by mouth 2 (two) times a day, Disp: , Rfl:     predniSONE 20 mg tablet, Take 1 tablet (20 mg total) by mouth 2 (two) times a day with meals for 5 days, Disp: 10 tablet, Rfl: 0    zonisamide (ZONEGRAN) 100 mg capsule, 100 mg 2 (two) times a day , Disp: , Rfl:     naproxen (NAPROSYN) 500 mg tablet, Take 1 tablet (500 mg total) by mouth 2 (two) times a day with meals (Patient not taking: Reported on 1/18/2024), Disp: 30 tablet, Rfl: 0    Current Allergies     Allergies as of 01/18/2024 - Reviewed 01/18/2024   Allergen Reaction Noted    Cetirizine hcl  11/15/2018    Klonopin [clonazepam] Other (See Comments) 11/09/2020            The following portions of the patient's history were reviewed and updated as appropriate: allergies, current medications, past family history, past medical history, past social history, past surgical history and problem list.     Past Medical History:   Diagnosis Date    Anxiety     Autism     Depression     Epilepsy (HCC)     Hypertension     Ovarian cyst     Seasonal allergies     Seizures (HCC)     last was a year ago    Sensory integration disorder     Tendonitis        Past Surgical History:   Procedure  "Laterality Date     SECTION      CHOLECYSTECTOMY      10/20    NO PAST SURGERIES      [place] LAPAROSCOPY SURG CHOLECYSTECTOMY N/A 10/15/2020    Procedure: CHOLECYSTECTOMY LAPAROSCOPIC;  Surgeon: Berhane Purcell MD;  Location:  MAIN OR;  Service: General       Family History   Problem Relation Age of Onset    Thyroid cancer Mother     Hiatal hernia Mother     Asthma Mother     Factor V Leiden deficiency Mother     Cancer Mother     Esophageal cancer Maternal Grandmother     Diabetes Maternal Grandfather     Heart failure Maternal Grandfather     Hypertension Maternal Grandfather     Diabetes Family     Heart disease Family     Hypertension Family          Medications have been verified.        Objective   /84   Pulse 103   Temp 97.6 °F (36.4 °C) (Temporal)   Resp 18   Ht 6' 1\" (1.854 m)   Wt (!) 167 kg (369 lb)   SpO2 98%   BMI 48.68 kg/m²   No LMP recorded. Patient has had an implant.       Physical Exam     Physical Exam  Constitutional:       General: She is not in acute distress.     Appearance: Normal appearance. She is not ill-appearing.   HENT:      Head: Normocephalic and atraumatic.      Right Ear: Tympanic membrane and ear canal normal.      Left Ear: Tympanic membrane and ear canal normal.      Nose: Mucosal edema and congestion present. No rhinorrhea.      Right Sinus: Maxillary sinus tenderness present.      Left Sinus: Maxillary sinus tenderness present.      Mouth/Throat:      Lips: Pink.      Pharynx: Posterior oropharyngeal erythema present. No oropharyngeal exudate.      Comments: +PND  Cardiovascular:      Rate and Rhythm: Normal rate and regular rhythm.      Heart sounds: Normal heart sounds, S1 normal and S2 normal. No murmur heard.  Pulmonary:      Effort: Pulmonary effort is normal.      Breath sounds: Normal air entry. Examination of the right-middle field reveals wheezing. Examination of the left-middle field reveals wheezing. Examination of the right-lower field reveals " wheezing. Examination of the left-lower field reveals wheezing. Wheezing present.   Neurological:      Mental Status: She is alert.

## 2024-01-19 NOTE — PATIENT INSTRUCTIONS
Increase fluids  Take Prednisone 20mg twice daily x 5 days  Use Albuterol 2 puffs every 4-6 hours as needed until cough free  Take Zithromax 2 tablets today, then 1 days 2-5  Follow-up with PCP if symptoms persist or worsen.

## 2024-01-27 ENCOUNTER — APPOINTMENT (OUTPATIENT)
Dept: LAB | Facility: MEDICAL CENTER | Age: 24
End: 2024-01-27
Payer: COMMERCIAL

## 2024-03-19 ENCOUNTER — ANNUAL EXAM (OUTPATIENT)
Age: 24
End: 2024-03-19
Payer: COMMERCIAL

## 2024-03-19 VITALS
HEIGHT: 72 IN | DIASTOLIC BLOOD PRESSURE: 72 MMHG | BODY MASS INDEX: 39.68 KG/M2 | SYSTOLIC BLOOD PRESSURE: 130 MMHG | WEIGHT: 293 LBS

## 2024-03-19 DIAGNOSIS — Z01.419 ENCOUNTER FOR ANNUAL ROUTINE GYNECOLOGICAL EXAMINATION: Primary | ICD-10-CM

## 2024-03-19 DIAGNOSIS — Z12.4 CERVICAL CANCER SCREENING: ICD-10-CM

## 2024-03-19 DIAGNOSIS — Z11.3 SCREEN FOR STD (SEXUALLY TRANSMITTED DISEASE): ICD-10-CM

## 2024-03-19 PROBLEM — E66.01 OBESITY, MORBID, BMI 40.0-49.9 (HCC): Status: RESOLVED | Noted: 2023-08-12 | Resolved: 2024-03-19

## 2024-03-19 PROCEDURE — 87491 CHLMYD TRACH DNA AMP PROBE: CPT | Performed by: NURSE PRACTITIONER

## 2024-03-19 PROCEDURE — 87591 N.GONORRHOEAE DNA AMP PROB: CPT | Performed by: NURSE PRACTITIONER

## 2024-03-19 PROCEDURE — G0145 SCR C/V CYTO,THINLAYER,RESCR: HCPCS | Performed by: NURSE PRACTITIONER

## 2024-03-19 PROCEDURE — S0612 ANNUAL GYNECOLOGICAL EXAMINA: HCPCS | Performed by: NURSE PRACTITIONER

## 2024-03-19 NOTE — PATIENT INSTRUCTIONS
Safe Sex Practices   AMBULATORY CARE:   Safe sex practices  are ways to prevent pregnancy and the spread of sexually transmitted infections (STIs). An STI happens when a virus or bacteria are spread through sexual activity. Safe sex practices help decrease or prevent body fluid exchange during sex. Body fluids include saliva, urine, blood, vaginal fluids, and semen. Oral, vaginal, and anal sex can all spread STIs.  Seek care immediately if:   A condom breaks, leaks, or slips off while you are having sex.    You notice sores on your penis, vagina, anal area, or skin around them.    You have had unsafe sex and want to discuss emergency contraception or treatment for STI exposure.    Call your doctor if:   You or your female sex partner might be pregnant.    You have questions or concerns about your condition or care.    Safe sex practices to follow before you have sex:   Talk to a new partner before you have sex.  Tell your partner if you have an STI. Ask about his or her sex history and if he or she has a current or past STI. Your partner may need to be tested and treated. Do not have sex while you are being treated for an STI, or with a partner who is being treated.    Limit your number of sex partners.  More than one sex partner can increase your risk for an STI. Do not have sex with anyone whose sex history you do not know.    Get tested for STIs if needed.  Get tested if you had sex with someone who has an STI. Get tested if you have unprotected sex with any new partner.    Talk to your healthcare provider about birth control.  Birth control can help prevent an unwanted pregnancy. There are many different types of birth control. Talk to your healthcare provider about which birth control method is right for you.    Ask about medicines to lower your risk for some STIs:      Vaccines  can help protect you from hepatitis A, hepatitis B, and the human papillomavirus (HPV). The HPV vaccine is usually given at 11 years,  but it may be given through 26 years to both females and males. Your provider can give you more information on vaccines to prevent STIs.    Pre-exposure prophylaxis (PrEP)  may be given if you are at high risk for HIV. PrEP is taken every day to prevent the virus from fully infecting the body.    Do not use alcohol or drugs before sex.  These can prevent you from thinking clearly and increase your risk for unsafe sex.    Safe sex practices to follow while you are having sex:   Use condoms and barrier methods for all types of sexual contact.  This includes oral, vaginal, and anal sex. Male and female condoms are available. Make sure that the condom fits and is put on correctly. Rubber latex sheets or dental dams can be used for oral sex. Use a new condom or latex barrier each time you have sex. Use latex condoms, if possible. [place] or natural condoms do not prevent STIs. If you or your partner is allergic to latex, use a nonlatex product, such as polyurethane. Use a second form of birth control with the condom to prevent pregnancy and STIs. Do not use male and female condoms together.    Only use water-based lubricants during sex.  Water-based lubricants help prevent sores or cuts in the vagina or on the penis. Prevent sores or cuts to decrease your risk for an STI. Do not use oil-based lubricants, such as baby oil or hand lotion, with latex condoms or barriers. These will weaken the latex and may cause the condom to break.    Do not use chemicals that irritate your skin.  Products that contain chemical irritants, such as spermicides, can irritate the lining of your vagina or rectum. Irritation may cause sores that can increase your risk for an STI.    Be careful when you have sex if you have open sores or cuts.  Open sores or cuts may increase your risk for an STI. Keep all open sores or cuts covered during sex. Do not have oral sex if you have cuts or sores in your mouth.    Do not do activities that can pass  germs.  Do not use saliva as a lubricant or share sex toys.    Follow up with your doctor as directed:  Write down your questions so you remember to ask them during your visits.  © Copyright [employer] 2023 Information is for End User's use only and may not be sold, redistributed or otherwise used for commercial purposes.  The above information is an  only. It is not intended as medical advice for individual conditions or treatments. Talk to your doctor, nurse or pharmacist before following any medical regimen to see if it is safe and effective for you.  Breast Self Exam for Women   AMBULATORY CARE:   A breast self-exam (BSE)  is a way to check your breasts for lumps and other changes. Regular BSEs can help you know how your breasts normally look and feel. Most breast lumps or changes are not cancer, but you should always have them checked by a healthcare provider.  Why you should do a BSE:  Breast cancer is the most common type of cancer in women. Even if you have mammograms, you may still want to do a BSE regularly. If you know how your breasts normally feel and look, it may help you know when to contact your healthcare provider. Mammograms can miss some cancers. You may find a lump during a BSE that did not show up on a mammogram.  When you should do a BSE:  If you have periods, you may want to do your BSE 1 week after your period ends. This is the time when your breasts may be the least swollen, lumpy, or tender. You can do regular BSEs even if you are breastfeeding or have breast implants.  Call your doctor if:   You find any lumps or changes in your breasts.    You have breast pain or fluid coming from your nipples.    You have questions or concerns about your condition or care.    How to do a BSE:       Look at your breasts in a mirror.  Look at the size and shape of each breast and nipple. Check for swelling, lumps, dimpling, scaly skin, or other skin changes. Look for nipple changes, such as a  nipple that is painful or beginning to pull inward. Gently squeeze both nipples and check to see if fluid (that is not breast milk) comes out of them. If you find any of these or other breast changes, contact your healthcare provider. Check your breasts while you sit or  the following 3 positions:    Hang your arms down at your sides.    Raise your hands and join them behind your head.    Put firm pressure with your hands on your hips. Bend slightly forward while you look at your breasts in the mirror.    Lie down and feel your breasts.  When you lie down, your breast tissue spreads out evenly over your chest. This makes it easier for you to feel for lumps and anything that may not be normal for your breasts. Do a BSE on one breast at a time.    Place a small pillow or towel under your left shoulder.  Put your left arm behind your head.    Use the 3 middle fingers of your right hand.  Use your fingertip pads, on the top of your fingers. Your fingertip pad is the most sensitive part of your finger.    Use small circles to feel your breast tissue.  Use your fingertip pads to make dime-sized, overlapping circles on your breast and armpits. Use light, medium, and firm pressure. First, press lightly. Second, press with medium pressure to feel a little deeper into the breast. Last, use firm pressure to feel deep within your breast.    Examine your entire breast area.  Examine the breast area from above the breast to below the breast where you feel only ribs. Make small circles with your fingertips, starting in the middle of your armpit. Make circles going up and down the breast area. Continue toward your breast and all the way across it. Examine the area from your armpit all the way over to the middle of your chest (breastbone). Stop at the middle of your chest.    Move the pillow or towel to your right shoulder, and put your right arm behind your head.  Use the 3 fingertip pads of your left hand, and repeat the  above steps to do a BSE on your right breast.  What else you can do to check for breast problems or cancer:  Talk to your healthcare provider about mammograms. A mammogram is an x-ray of your breasts to screen for breast cancer or other problems. Your provider can tell you the benefits and risks of mammograms. The first mammogram is usually at age 45 or 50. Your provider may recommend you start at 40 or younger if your risk for breast cancer is high. Mammograms usually continue every 1 to 2 years until age 74.       Follow up with your doctor as directed:  Write down your questions so you remember to ask them during your visits.  © Copyright [employer] 2023 Information is for End User's use only and may not be sold, redistributed or otherwise used for commercial purposes.  The above information is an  only. It is not intended as medical advice for individual conditions or treatments. Talk to your doctor, nurse or pharmacist before following any medical regimen to see if it is safe and effective for you.
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
## 2024-03-19 NOTE — PROGRESS NOTES
Patient presents for a routine annual visit  Menarche- 15Y/O  Last Pap Smear- collect today  LMP- 24  Birth control- Nexplanon and condom    Non smoker  Social drinker  Currently sexually active  Gardasil series-completed  No family history of uterine, ovarian, cervical or breast cancer    No concerns/questions for today's visit

## 2024-03-21 LAB
C TRACH DNA SPEC QL NAA+PROBE: NEGATIVE
N GONORRHOEA DNA SPEC QL NAA+PROBE: NEGATIVE

## 2024-03-24 LAB
LAB AP GYN PRIMARY INTERPRETATION: NORMAL
Lab: NORMAL

## 2024-06-27 ENCOUNTER — TELEPHONE (OUTPATIENT)
Age: 24
End: 2024-06-27

## 2024-06-27 NOTE — TELEPHONE ENCOUNTER
Patient called stating that she wants a Nexplanon removal and insertion. She stated that she spoke to Anita Nguyen in March when she was in for her yearly about it. Do we still need a consult?

## 2024-08-10 ENCOUNTER — OFFICE VISIT (OUTPATIENT)
Dept: URGENT CARE | Facility: MEDICAL CENTER | Age: 24
End: 2024-08-10
Payer: COMMERCIAL

## 2024-08-10 VITALS
BODY MASS INDEX: 41.02 KG/M2 | WEIGHT: 293 LBS | HEART RATE: 80 BPM | OXYGEN SATURATION: 99 % | TEMPERATURE: 97.7 F | SYSTOLIC BLOOD PRESSURE: 141 MMHG | HEIGHT: 71 IN | DIASTOLIC BLOOD PRESSURE: 80 MMHG | RESPIRATION RATE: 18 BRPM

## 2024-08-10 DIAGNOSIS — J02.0 STREP THROAT: Primary | ICD-10-CM

## 2024-08-10 LAB — S PYO AG THROAT QL: POSITIVE

## 2024-08-10 PROCEDURE — 87880 STREP A ASSAY W/OPTIC: CPT | Performed by: PHYSICIAN ASSISTANT

## 2024-08-10 PROCEDURE — G0383 LEV 4 HOSP TYPE B ED VISIT: HCPCS | Performed by: PHYSICIAN ASSISTANT

## 2024-08-10 PROCEDURE — S9083 URGENT CARE CENTER GLOBAL: HCPCS | Performed by: PHYSICIAN ASSISTANT

## 2024-08-10 RX ORDER — AMOXICILLIN 500 MG/1
500 TABLET, FILM COATED ORAL 3 TIMES DAILY
Qty: 30 TABLET | Refills: 0 | Status: SHIPPED | OUTPATIENT
Start: 2024-08-10 | End: 2024-08-20

## 2024-08-10 NOTE — PATIENT INSTRUCTIONS
Increase fluids  Motrin as needed for throat pain  Take Amox 500mg 3x daily x 10 days  Follow-up with PCP if symptoms worsen or persist.

## 2024-08-10 NOTE — PROGRESS NOTES
[facility]        NAME: Mary Beth Knowles is a 24 y.o. female  : 2000    MRN: [id number]  DATE: August 10, 2024  TIME: 2:48 PM    Assessment and Plan   Strep throat [J02.0]  1. Strep throat  POCT rapid ANTIGEN strepA    amoxicillin (AMOXIL) 500 MG tablet            Patient Instructions     Increase fluids  Motrin as needed for throat pain  Take Amox 500mg 3x daily x 10 days  Follow-up with PCP if symptoms worsen or persist.       If tests have been performed at [facility], our office will contact you with results if changes need to be made to the care plan discussed with you at the visit.  [facility]    Chief Complaint     Chief Complaint   Patient presents with    Sore Throat     Sore throat, cough, chest congestion; ongoing for 3 days; states both her daughters were diagnosed with bronchitis          History of Present Illness       Mary Beth is a 24-year-old female who presents with a 3-day history of acute onset sore throat, mild cough and congestion.  Patient denies any fever, headache or upset stomach since the onset of her illness.  She reports her children had similar symptoms and were treated for bronchitis.    Sore Throat   Associated symptoms include congestion and coughing.       Review of Systems   Review of Systems   Constitutional: Negative.    HENT:  Positive for congestion and sore throat.    Respiratory:  Positive for cough.    Gastrointestinal: Negative.          Current Medications       Current Outpatient Medications:     amoxicillin (AMOXIL) 500 MG tablet, Take 1 tablet (500 mg total) by mouth 3 (three) times a day for 10 days, Disp: 30 tablet, Rfl: 0    Advair -21 MCG/ACT inhaler, INHALE 2 PUFFS 2 (TWO) TIMES A DAY RINSE MOUTH AFTER USE., Disp: 12 g, Rfl: 0    albuterol (ProAir HFA) 90 mcg/act inhaler, Inhale 2 puffs every 6 (six) hours as needed for wheezing, Disp: 8.5 g, Rfl: 0    cholecalciferol (VITAMIN D3) 400 units  "tablet, Take 400 Units by mouth daily, Disp: , Rfl:     clonazePAM (KlonoPIN) 2 mg tablet, Place 2 mg under the tongue as needed for seizures, Disp: , Rfl:     etonogestrel (NEXPLANON) subdermal implant, 68 mg by Subdermal route once, Disp: , Rfl:     lamoTRIgine  MG TB24, Take 1 tablet by mouth 2 (two) times a day, Disp: , Rfl:     zonisamide (ZONEGRAN) 100 mg capsule, 100 mg 2 (two) times a day , Disp: , Rfl:     Current Allergies     Allergies as of 08/10/2024 - Reviewed 08/10/2024   Allergen Reaction Noted    Cetirizine hcl  11/15/2018    Klonopin [clonazepam] Other (See Comments) 2020            The following portions of the patient's history were reviewed and updated as appropriate: allergies, current medications, past family history, past medical history, past social history, past surgical history and problem list.     Past Medical History:   Diagnosis Date    Anxiety     Autism     Depression     Epilepsy (HCC)     Hypertension     Ovarian cyst     Seasonal allergies     Seizures (HCC)     last was a year ago    Sensory integration disorder     Tendonitis        Past Surgical History:   Procedure Laterality Date     SECTION      CHOLECYSTECTOMY      10/20    NO PAST SURGERIES      [place] LAPAROSCOPY SURG CHOLECYSTECTOMY N/A 10/15/2020    Procedure: CHOLECYSTECTOMY LAPAROSCOPIC;  Surgeon: Berhane Purcell MD;  Location:  MAIN OR;  Service: General       Family History   Problem Relation Age of Onset    Thyroid cancer Mother     Hiatal hernia Mother     Asthma Mother     Factor V Leiden deficiency Mother     Cancer Mother     Esophageal cancer Maternal Grandmother     Diabetes Maternal Grandfather     Heart failure Maternal Grandfather     Hypertension Maternal Grandfather     Diabetes Family     Heart disease Family     Hypertension Family          Medications have been verified.        Objective   /80   Pulse 80   Temp 97.7 °F (36.5 °C) (Temporal)   Resp 18   Ht 5' 11\" (1.803 m)   " Wt (!) 168 kg (371 lb)   SpO2 99%   BMI 51.74 kg/m²   No LMP recorded. Patient has had an implant.       Physical Exam     Physical Exam  Constitutional:       General: She is not in acute distress.     Appearance: She is well-developed. She is not ill-appearing.   HENT:      Head: Normocephalic and atraumatic.      Right Ear: Tympanic membrane and ear canal normal.      Left Ear: Tympanic membrane and ear canal normal.      Nose: Nose normal.      Mouth/Throat:      Lips: Pink.      Pharynx: Posterior oropharyngeal erythema present. No oropharyngeal exudate.   Cardiovascular:      Rate and Rhythm: Normal rate and regular rhythm.      Heart sounds: Normal heart sounds, S1 normal and S2 normal. No murmur heard.  Pulmonary:      Effort: Pulmonary effort is normal.      Breath sounds: Normal breath sounds and air entry.   Neurological:      Mental Status: She is alert.

## 2024-08-22 ENCOUNTER — OFFICE VISIT (OUTPATIENT)
Dept: URGENT CARE | Facility: MEDICAL CENTER | Age: 24
End: 2024-08-22
Payer: COMMERCIAL

## 2024-08-22 VITALS
RESPIRATION RATE: 18 BRPM | OXYGEN SATURATION: 98 % | BODY MASS INDEX: 41.02 KG/M2 | SYSTOLIC BLOOD PRESSURE: 138 MMHG | TEMPERATURE: 97.9 F | DIASTOLIC BLOOD PRESSURE: 91 MMHG | HEART RATE: 87 BPM | HEIGHT: 71 IN | WEIGHT: 293 LBS

## 2024-08-22 DIAGNOSIS — J34.89 POSTERIOR RHINORRHEA: ICD-10-CM

## 2024-08-22 DIAGNOSIS — J02.9 ACUTE PHARYNGITIS, UNSPECIFIED ETIOLOGY: ICD-10-CM

## 2024-08-22 DIAGNOSIS — R09.81 NASAL CONGESTION: ICD-10-CM

## 2024-08-22 DIAGNOSIS — J06.9 UPPER RESPIRATORY TRACT INFECTION, UNSPECIFIED TYPE: Primary | ICD-10-CM

## 2024-08-22 DIAGNOSIS — R05.1 ACUTE COUGH: ICD-10-CM

## 2024-08-22 LAB — S PYO AG THROAT QL: POSITIVE

## 2024-08-22 PROCEDURE — G0382 LEV 3 HOSP TYPE B ED VISIT: HCPCS | Performed by: PHYSICIAN ASSISTANT

## 2024-08-22 PROCEDURE — 87880 STREP A ASSAY W/OPTIC: CPT | Performed by: PHYSICIAN ASSISTANT

## 2024-08-22 PROCEDURE — S9083 URGENT CARE CENTER GLOBAL: HCPCS | Performed by: PHYSICIAN ASSISTANT

## 2024-08-22 PROCEDURE — 87070 CULTURE OTHR SPECIMN AEROBIC: CPT | Performed by: PHYSICIAN ASSISTANT

## 2024-08-22 RX ORDER — PREDNISONE 20 MG/1
40 TABLET ORAL DAILY
Qty: 6 TABLET | Refills: 0 | Status: SHIPPED | OUTPATIENT
Start: 2024-08-22

## 2024-08-22 NOTE — PROGRESS NOTES
[facility]        NAME: Mary Beth Knowles is a 24 y.o. female  : 2000    MRN: [id number]  DATE: 2024  TIME: 6:00 PM    Assessment and Plan   Upper respiratory tract infection, unspecified type [J06.9]  1. Upper respiratory tract infection, unspecified type  predniSONE 20 mg tablet      2. Acute cough  POCT rapid ANTIGEN strepA    Throat culture    Throat culture    predniSONE 20 mg tablet      3. Nasal congestion  predniSONE 20 mg tablet      4. Posterior rhinorrhea  predniSONE 20 mg tablet      5. Acute pharyngitis, unspecified etiology              Patient Instructions     Take prednisone 40 mg daily x3 days.   Motrin as needed for pain/fever.  Follow up with PCP in 3-5 days.  Proceed to  ER if symptoms worsen.    If tests have been performed at [facility], our office will contact you with results if changes need to be made to the care plan discussed with you at the visit. You can review your full results on [facility].     Chief Complaint     Chief Complaint   Patient presents with    Cough     Pt reports dry cough that has been present for 3 days-pt was recently treated for strept 8/10         History of Present Illness       Patient is a 23yo female who presents with cough and sore throat for 3-4 days. She was recently seen by us and dx with strep throat, finishing her 10-day course of amoxicillin on 24. She notes that she still has not felt right since being off the abx. Denies fever. Denies SOB/chest pain.         Review of Systems   Review of Systems   Constitutional: Negative.    HENT:  Positive for congestion, postnasal drip and sore throat.    Respiratory:  Positive for cough. Negative for shortness of breath and wheezing.    Cardiovascular: Negative.    Gastrointestinal: Negative.    Skin: Negative.    Neurological: Negative.          Current Medications       Current Outpatient Medications:     Advair -21 MCG/ACT inhaler, INHALE 2 PUFFS 2 (TWO) TIMES A  DAY RINSE MOUTH AFTER USE., Disp: 12 g, Rfl: 0    albuterol (ProAir HFA) 90 mcg/act inhaler, Inhale 2 puffs every 6 (six) hours as needed for wheezing, Disp: 8.5 g, Rfl: 0    cholecalciferol (VITAMIN D3) 400 units tablet, Take 400 Units by mouth daily, Disp: , Rfl:     clonazePAM (KlonoPIN) 2 mg tablet, Place 2 mg under the tongue as needed for seizures, Disp: , Rfl:     etonogestrel (NEXPLANON) subdermal implant, 68 mg by Subdermal route once, Disp: , Rfl:     lamoTRIgine  MG TB24, Take 1 tablet by mouth 2 (two) times a day, Disp: , Rfl:     predniSONE 20 mg tablet, Take 2 tablets (40 mg total) by mouth daily, Disp: 6 tablet, Rfl: 0    zonisamide (ZONEGRAN) 100 mg capsule, 100 mg 2 (two) times a day , Disp: , Rfl:     Current Allergies     Allergies as of 2024 - Reviewed 2024   Allergen Reaction Noted    Cetirizine hcl  11/15/2018    Klonopin [clonazepam] Other (See Comments) 2020            The following portions of the patient's history were reviewed and updated as appropriate: allergies, current medications, past family history, past medical history, past social history, past surgical history and problem list.     Past Medical History:   Diagnosis Date    Anxiety     Autism     Depression     Epilepsy (HCC)     Hypertension     Ovarian cyst     Seasonal allergies     Seizures (HCC)     last was a year ago    Sensory integration disorder     Tendonitis        Past Surgical History:   Procedure Laterality Date     SECTION      CHOLECYSTECTOMY      10/20    NO PAST SURGERIES      [place] LAPAROSCOPY SURG CHOLECYSTECTOMY N/A 10/15/2020    Procedure: CHOLECYSTECTOMY LAPAROSCOPIC;  Surgeon: Berhane Purcell MD;  Location:  MAIN OR;  Service: General       Family History   Problem Relation Age of Onset    Thyroid cancer Mother     Hiatal hernia Mother     Asthma Mother     Factor V Leiden deficiency Mother     Cancer Mother     Esophageal cancer Maternal Grandmother     Diabetes Maternal  "Grandfather     Heart failure Maternal Grandfather     Hypertension Maternal Grandfather     Diabetes Family     Heart disease Family     Hypertension Family          Medications have been verified.        Objective   /91   Pulse 87   Temp 97.9 °F (36.6 °C)   Resp 18   Ht 5' 11\" (1.803 m)   Wt (!) 168 kg (370 lb 3.2 oz)   SpO2 98%   BMI 51.63 kg/m²        Physical Exam     Physical Exam  Vitals reviewed.   Constitutional:       Appearance: Normal appearance.   HENT:      Head: Normocephalic and atraumatic.      Right Ear: Tympanic membrane, ear canal and external ear normal.      Left Ear: Tympanic membrane, ear canal and external ear normal.      Nose: Congestion present.      Mouth/Throat:      Mouth: Mucous membranes are moist.      Pharynx: Oropharynx is clear. Posterior oropharyngeal erythema present. No oropharyngeal exudate.      Comments: + cobblestoning  Cardiovascular:      Rate and Rhythm: Normal rate and regular rhythm.      Pulses: Normal pulses.      Heart sounds: Normal heart sounds. No murmur heard.  Pulmonary:      Effort: Pulmonary effort is normal. No respiratory distress.      Breath sounds: No wheezing.      Comments: + cough  Musculoskeletal:      Cervical back: Normal range of motion and neck supple.   Lymphadenopathy:      Cervical: No cervical adenopathy.   Skin:     General: Skin is warm and dry.      Capillary Refill: Capillary refill takes less than 2 seconds.      Findings: No rash.   Neurological:      Mental Status: She is alert.       Rapid Strep: POS (unreliable 2/2 recent strep infection)            "

## 2024-08-24 LAB — BACTERIA THROAT CULT: NORMAL

## 2024-09-12 ENCOUNTER — APPOINTMENT (OUTPATIENT)
Age: 24
End: 2024-09-12
Payer: COMMERCIAL

## 2024-09-12 ENCOUNTER — OFFICE VISIT (OUTPATIENT)
Age: 24
End: 2024-09-12
Payer: COMMERCIAL

## 2024-09-12 VITALS
DIASTOLIC BLOOD PRESSURE: 80 MMHG | SYSTOLIC BLOOD PRESSURE: 120 MMHG | WEIGHT: 293 LBS | HEIGHT: 71 IN | BODY MASS INDEX: 41.02 KG/M2

## 2024-09-12 DIAGNOSIS — N92.6 IRREGULAR MENSES: Primary | ICD-10-CM

## 2024-09-12 DIAGNOSIS — G40.909 EPILEPSY AFFECTING PREGNANCY, ANTEPARTUM (HCC): ICD-10-CM

## 2024-09-12 DIAGNOSIS — N92.6 IRREGULAR MENSES: ICD-10-CM

## 2024-09-12 DIAGNOSIS — O99.350 EPILEPSY AFFECTING PREGNANCY, ANTEPARTUM (HCC): ICD-10-CM

## 2024-09-12 LAB
EST. AVERAGE GLUCOSE BLD GHB EST-MCNC: 108 MG/DL
HBA1C MFR BLD: 5.4 %
PROLACTIN SERPL-MCNC: 12.09 NG/ML (ref 3.34–26.72)

## 2024-09-12 PROCEDURE — 82627 DEHYDROEPIANDROSTERONE: CPT

## 2024-09-12 PROCEDURE — 84410 TESTOSTERONE BIOAVAILABLE: CPT

## 2024-09-12 PROCEDURE — 36415 COLL VENOUS BLD VENIPUNCTURE: CPT

## 2024-09-12 PROCEDURE — 83036 HEMOGLOBIN GLYCOSYLATED A1C: CPT

## 2024-09-12 PROCEDURE — 84146 ASSAY OF PROLACTIN: CPT

## 2024-09-12 PROCEDURE — 99213 OFFICE O/P EST LOW 20 MIN: CPT | Performed by: NURSE PRACTITIONER

## 2024-09-12 NOTE — PATIENT INSTRUCTIONS
"Patient Education     Absent or irregular periods   The Basics   Written by the doctors and editors at [facility]   What are absent or irregular periods? -- Absent or irregular periods are periods that do not happen at all or that happen less than 6 to 8 times a year. If you do not get your period for a while, it could be because you are pregnant. Or there might be another reason, such as a medical condition.  What causes absent or irregular periods? -- Absent or irregular periods can be caused by:   Pregnancy   Polycystic ovary syndrome (\"PCOS\") - PCOS is the most common cause of irregular periods. In people with this condition, the ovaries make too much male hormone. This can disrupt monthly periods and cause excess facial hair, acne, and problems with weight.   A decrease in your body's energy supply - This can result from exercising too much, being very stressed, having an eating disorder, or burning more calories than you take in.   Too much prolactin - Prolactin is a hormone made in the \"pituitary gland\", which is a small organ at the base of the brain.   Early or premature menopause - Menopause is the time when a person naturally stops having periods. This normally happens between the ages of 45 and 55. But in some people, it happens earlier. Doctors use the term \"early menopause\" in people who go through menopause between the ages of 40 and 45 years. For people younger than 40, doctors use the term \"premature menopause.\"   Certain types of hormonal birth control - Some forms of birth control can cause absent or irregular periods. This is most common with those that contain only the hormone progestin. Examples include the progestin-only pill (sometimes called the \"minipill\"), the implant, and hormone-containing intrauterine devices (IUDs). It is also common with birth control pills that have a lower dose of estrogen. It does not mean that the pill is not working, as long as you are taking it every day.  If you " "take birth control pills and use \"continuous dosing,\" you will also not get a monthly period. Continuous dosing means skipping the hormone-free pills that come with your prescription, and taking hormone pills every day instead.  Should I see a doctor or nurse? -- See your doctor or nurse if:   You are older than 15 and still have not had your period.   You used to get periods, but you have not had a period for more than 3 months.   Your periods happen more than 45 days apart.  What other symptoms should I watch for? -- Tell your doctor if you:   Think that you might be pregnant   Have family members with irregular periods   Have bad acne or hair on your chest or face   Have gained weight and are having trouble losing it   Have hot flashes, which feel like a wave of heat that starts in your chest and face and then moves through your body   Have night sweats, which are hot flashes that happen when you are asleep   Have new headaches or trouble seeing   Notice milky fluid coming out of your breasts   Are under lots of stress   Have recently lost weight   Are exercising more than you used to   Have changed how much you eat or what kinds of foods you eat   Are taking any medicines, herbs, or vitamins  Are there tests I should have? -- Your doctor or nurse will decide which tests you should have based on your age, other symptoms, and individual situation.  Here are the most common tests doctors use to find the cause of absent or irregular periods:   Pregnancy test - Pregnancy is a common cause of missed periods. Your doctor will want to find out if you are pregnant before doing any other tests.   Blood tests - These measure hormones that affect the reproductive system.   Pelvic ultrasound - This test uses sound waves to make a picture of your uterus, cervix, and vagina (figure 1). The picture can show if there is something wrong with these organs.   MRI - This test uses a large magnet to make detailed pictures of the " "brain. It can show if there is a problem in the part of the brain that controls the body's hormones. An MRI might be done if blood tests are abnormal.  How are absent and irregular periods treated? -- That depends on what is causing your missed periods, and on whether you want to get pregnant. Possible treatments include:   Birth control pills to make periods regular   Losing weight if you are overweight   Medicines to help you get pregnant if you are having trouble getting pregnant on your own   Changing the way you eat and exercise, such as:   Eating more calories   Gaining weight if you weigh too little   Easing up on exercise, if you exercise a lot   Reducing stress   Hormones to treat hot flashes (if you are going through early menopause)   Medicines to lower prolactin levels (if your pituitary gland is making too much prolactin)  Can absent and irregular periods be prevented? -- You can reduce your chances of missing periods by eating well and staying at a healthy weight. Being too thin or too heavy can cause irregular periods.  All topics are updated as new evidence becomes available and our peer review process is complete.  This topic retrieved from [employer] on: Feb 26, 2024.  Topic 42760 Version 11.0  Release: 32.2.4 - C32.56  © 2024 UpToDate, Inc. and/or its affiliates. All rights reserved.  figure 1: Female reproductive anatomy     The internal organs that make up the female reproductive system are located in the lower belly. These include the uterus, fallopian tubes, ovaries, cervix, and vagina.  The uterus has an inner lining, called the \"endometrium,\" and a thicker outer layer, called the \"myometrium.\"  Graphic 27732 Version 8.0  Consumer Information Use and Disclaimer   Disclaimer: This generalized information is a limited summary of diagnosis, treatment, and/or medication information. It is not meant to be comprehensive and should be used as a tool to help the user understand and/or assess potential " diagnostic and treatment options. It does NOT include all information about conditions, treatments, medications, side effects, or risks that may apply to a specific patient. It is not intended to be medical advice or a substitute for the medical advice, diagnosis, or treatment of a health care provider based on the health care provider's examination and assessment of a patient's specific and unique circumstances. Patients must speak with a health care provider for complete information about their health, medical questions, and treatment options, including any risks or benefits regarding use of medications. This information does not endorse any treatments or medications as safe, effective, or approved for treating a specific patient. UpToDate, Inc. and its affiliates disclaim any warranty or liability relating to this information or the use thereof.The use of this information is governed by the Terms of Use, available at https://www.woltersOnePINuwer.com/en/know/clinical-effectiveness-terms. 2024© UpToDate, Inc. and its affiliates and/or licensors. All rights reserved.  Copyright   © 2024 UpToDate, Inc. and/or its affiliates. All rights reserved.

## 2024-09-12 NOTE — PROGRESS NOTES
Diagnoses and all orders for this visit:    Irregular menses  -     Testosterone, Free and Weakly Bound; Future  -     Prolactin; Future  -     DHEA-sulfate; Future  -     Hemoglobin A1C; Future    Epilepsy affecting pregnancy, antepartum (HCC)  Comments:  No seizures in 2.5 years. She is driving now.    Call if no symptom improvement, all questions answered, return for Nexplanon replacement.          Pleasant 24 y.o. female patient here for for discussion of possible PCOS.  She is concerned because of her family history and reports of irregular menses prior to the Nexplanon.  (twins). She is mostly amenorrheic with the Nexplanon but had a recent cycle for the first time.  She is due for a new Nexplanon in December.  She is concerned about her fertility.  I discussed that it is difficult to check for fertility if she has not been trying for pregnancy for a full year and especially since she has a Nexplanon in place.  She will get some labs drawn today and has an appointment for Nexplanon reinsertion.  She was here to just have some questions answered.  She is currently sexually active, he moved here from [place] and lives with her.    Past Medical History:   Diagnosis Date    Anxiety     Autism     Depression     Epilepsy (HCC)     Hypertension     Ovarian cyst     Seasonal allergies     Seizures (HCC)     last was a year ago    Sensory integration disorder     Tendonitis      Past Surgical History:   Procedure Laterality Date     SECTION      CHOLECYSTECTOMY      10/20    NO PAST SURGERIES      [place] LAPAROSCOPY SURG CHOLECYSTECTOMY N/A 10/15/2020    Procedure: CHOLECYSTECTOMY LAPAROSCOPIC;  Surgeon: Berhane Purcell MD;  Location:  MAIN OR;  Service: General     Social History     Tobacco Use    Smoking status: Never    Smokeless tobacco: Never   Vaping Use    Vaping status: Never Used   Substance Use Topics    Alcohol use: Yes     Comment: occasion    Drug use: Not Currently     Types: Marijuana      Comment: medical marijuana     Family History   Problem Relation Age of Onset    Thyroid cancer Mother     Hiatal hernia Mother     Asthma Mother     Factor V Leiden deficiency Mother     Cancer Mother     Esophageal cancer Maternal Grandmother     Diabetes Maternal Grandfather     Heart failure Maternal Grandfather     Hypertension Maternal Grandfather     Diabetes Family     Heart disease Family     Hypertension Family        Current Outpatient Medications:     Advair -21 MCG/ACT inhaler, INHALE 2 PUFFS 2 (TWO) TIMES A DAY RINSE MOUTH AFTER USE., Disp: 12 g, Rfl: 0    albuterol (ProAir HFA) 90 mcg/act inhaler, Inhale 2 puffs every 6 (six) hours as needed for wheezing, Disp: 8.5 g, Rfl: 0    cholecalciferol (VITAMIN D3) 400 units tablet, Take 400 Units by mouth daily, Disp: , Rfl:     clonazePAM (KlonoPIN) 2 mg tablet, Place 2 mg under the tongue as needed for seizures, Disp: , Rfl:     etonogestrel (NEXPLANON) subdermal implant, 68 mg by Subdermal route once, Disp: , Rfl:     lamoTRIgine  MG TB24, Take 1 tablet by mouth 2 (two) times a day, Disp: , Rfl:     zonisamide (ZONEGRAN) 100 mg capsule, 100 mg 2 (two) times a day , Disp: , Rfl:     Allergies   Allergen Reactions    Cetirizine Hcl     Klonopin [Clonazepam] Other (See Comments)     Long term use causes suicidal thoughts. Has used short term for seizure rescue     OB History    Para Term  AB Living   1 1 1 0 0 2   SAB IAB Ectopic Multiple Live Births   0 0 0 1 2      # Outcome Date GA Lbr Regan/2nd Weight Sex Type Anes PTL Lv   1A Term 23           1B Term 23              Obstetric Comments   Menarche 14      Preeclampsia with twins        Vitals:    24 0701   BP: 120/80     Body mass index is 52.44 kg/m².    Review of Systems   Constitutional: Negative for chills, fatigue, fever and unexpected weight change.   Respiratory: Negative for shortness of breath.    Gastrointestinal: Negative for anal bleeding, blood in  stool, constipation and diarrhea.   Genitourinary: Negative for difficulty urinating, dysuria and hematuria.     Physical Exam   Constitutional: She appears well-developed and well-nourished. No distress.   HENT: atraumatic, EOMI bilaterally  Head: Normocephalic.   Neck: Normal range of motion. Neck supple.   Pulmonary: Effort normal. Clear to auscultation bilaterally,  Cardiovascular: Normal S1, S2 RRR, no murmur auscultated  Abdominal: Soft. Nontender.  Extremities: moves all extremities well, no edema noted upper or lower  Skin: clean, dry and intact, warm to touch  LEFT ARM NEXPLANON IN PLACE

## 2024-09-13 LAB — DHEA-S SERPL-MCNC: 190 UG/DL (ref 110–431.7)

## 2024-09-17 LAB
DEPRECATED TESTOST FREE FR SERPL: 3.4 NG/DL (ref 0–9.5)
TESTOST SERPL-MCNC: 30 NG/DL (ref 13–71)
TESTOSTERONE.FREE+WB MFR SERPL: 11.4 % (ref 3–18)

## 2025-01-07 ENCOUNTER — PROCEDURE VISIT (OUTPATIENT)
Age: 25
End: 2025-01-07
Payer: COMMERCIAL

## 2025-01-07 VITALS — DIASTOLIC BLOOD PRESSURE: 78 MMHG | BODY MASS INDEX: 51.74 KG/M2 | SYSTOLIC BLOOD PRESSURE: 120 MMHG | WEIGHT: 293 LBS

## 2025-01-07 DIAGNOSIS — Z30.46 ENCOUNTER FOR REMOVAL AND REINSERTION OF NEXPLANON: Primary | ICD-10-CM

## 2025-01-07 PROBLEM — E66.01 CLASS 3 SEVERE OBESITY DUE TO EXCESS CALORIES WITH BODY MASS INDEX (BMI) OF 50.0 TO 59.9 IN ADULT (HCC): Status: ACTIVE | Noted: 2023-08-12

## 2025-01-07 PROBLEM — E66.813 CLASS 3 SEVERE OBESITY DUE TO EXCESS CALORIES WITH BODY MASS INDEX (BMI) OF 50.0 TO 59.9 IN ADULT (HCC): Status: ACTIVE | Noted: 2023-08-12

## 2025-01-07 PROCEDURE — 11983 REMOVE/INSERT DRUG IMPLANT: CPT | Performed by: NURSE PRACTITIONER

## 2025-01-07 RX ORDER — TIRZEPATIDE 5 MG/.5ML
5 INJECTION, SOLUTION SUBCUTANEOUS WEEKLY
COMMUNITY
Start: 2024-12-09

## 2025-01-07 NOTE — PATIENT INSTRUCTIONS
Patient Education     Etonogestrel (e toe earlene jasso)   Brand Names: US Nexplanon   Brand Names: Ted Nexplanon   What is this drug used for?   It is used to prevent pregnancy.  What do I need to tell my doctor BEFORE I take this drug?   If you are allergic to this drug; any part of this drug; or any other drugs, foods, or substances. Tell your doctor about the allergy and what signs you had.  If you have any of these health problems: Liver disease or liver tumors.  If you have ever had any of these health problems: Blood clots, breast cancer or other kind of cancer where hormones make it grow, heart attack, or stroke.  If you have unexplained vaginal bleeding.  If you have given birth within the last 21 days.  If you are pregnant or may be pregnant. Do not take this drug if you are pregnant.  If you have not started your period.  This is not a list of all drugs or health problems that interact with this drug.  Tell your doctor and pharmacist about all of your drugs (prescription or OTC, natural products, vitamins) and health problems. You must check to make sure that it is safe for you to take this drug with all of your drugs and health problems. Do not start, stop, or change the dose of any drug without checking with your doctor.  What are some things I need to know or do while I take this drug?   Tell all of your health care providers that you take this drug. This includes your doctors, nurses, pharmacists, and dentists.  After this drug has been put in, you may need to use a non-hormone birth control like condoms for some time. Talk with your doctor.  Breaking or bending of the implant may happen from things like contact sports or pushing on the area where it was placed. If the implant breaks, it can move from the original spot. If you cannot feel the implant or if you think that it has broken, bent, or moved while in your arm, talk with your doctor.  Problems have happened when this drug was put in or taken  out. These include pain; burning, numbness, or tingling; dizziness; passing out; bruising or bleeding; scars; or infection. If you have questions, talk with the doctor.  If you get pregnant while taking this drug, the chance of pregnancy outside of the uterus (ectopic pregnancy) may be raised. Talk with your doctor.  Blood clots have happened with this drug. Sometimes, these blood clots have been deadly. Talk with the doctor.  Talk with your doctor if you will need to be still for long periods of time like long trips, bedrest after surgery, or illness. Not moving for long periods may raise your chance of blood clots.  Call your doctor right away if you have signs of a blood clot like chest pain or pressure; coughing up blood; shortness of breath; swelling, warmth, numbness, change of color, or pain in a leg or arm; or trouble speaking or swallowing.  A cyst on the ovary may rarely happen.  If you have high blood sugar (diabetes), talk with your doctor. This drug may raise blood sugar.  Check your blood sugar as you have been told by your doctor.  Certain drugs, herbal products, or health problems may cause hormone-based birth control to not work as well. Be sure your doctor knows about all of your drugs and health problems. You will need to see if you also need to use a non-hormone form of birth control like condoms.  This drug does not stop the spread of diseases like HIV or hepatitis that are passed through having sex. Do not have any kind of sex without using a latex or polyurethane condom. If you have questions, talk with your doctor.  The chance of getting cervical cancer may be higher in people who take hormone-based birth control. However, this may be due to other reasons. If you have questions, talk with the doctor.  Some studies have shown the risk of breast cancer is raised when taking hormone-based birth control for a long time. However, other studies have not shown this risk. If you have questions, talk  with the doctor.  If this drug is removed and you do not want to get pregnant, use birth control right after it is removed. Pregnancy has happened shortly after this drug was removed. Talk with the doctor.  A pregnancy test will be done to show that you are NOT pregnant before starting this drug. If you get pregnant while taking this drug, call your doctor right away.  Tell your doctor if you are breast-feeding. You will need to talk about any risks to your baby.  What are some side effects that I need to call my doctor about right away?   WARNING/CAUTION: Even though it may be rare, some people may have very bad and sometimes deadly side effects when taking a drug. Tell your doctor or get medical help right away if you have any of the following signs or symptoms that may be related to a very bad side effect:  Signs of an allergic reaction, like rash; hives; itching; red, swollen, blistered, or peeling skin with or without fever; wheezing; tightness in the chest or throat; trouble breathing, swallowing, or talking; unusual hoarseness; or swelling of the mouth, face, lips, tongue, or throat.  Signs of liver problems like dark urine, tiredness, decreased appetite, upset stomach or stomach pain, light-colored stools, throwing up, or yellow skin or eyes.  Signs of high blood pressure like very bad headache or dizziness, passing out, or change in eyesight.  Signs of gallbladder problems like pain in the upper right belly area, right shoulder area, or between the shoulder blades; yellow skin or eyes; fever with chills; bloating; or very upset stomach or throwing up.  Signs of skin infection like oozing, heat, swelling, redness, or pain.  Weakness on 1 side of the body, trouble speaking or thinking, change in balance, drooping on one side of the face, or blurred eyesight.  Depression or other mood changes.  Eyesight changes or loss, bulging eyes, or change in how contact lenses feel.  A lump in the breast, breast pain or  soreness, or nipple discharge.  Flu-like signs.  Vaginal bleeding that is not normal.  This drug may cause you to swell or keep fluid in your body. Tell your doctor if you have swelling, weight gain, or trouble breathing.  What are some other side effects of this drug?   All drugs may cause side effects. However, many people have no side effects or only have minor side effects. Call your doctor or get medical help if any of these side effects or any other side effects bother you or do not go away:  Weight gain.  Dizziness or headache.  Pimples (acne).  Vaginal irritation.  Period (menstrual) changes. These include spotting between cycles or very light periods.  Irritation where keiry was placed.  Stomach pain.  Throat irritation.  Back pain.  Upset stomach.  Feeling nervous and excitable.  These are not all of the side effects that may occur. If you have questions about side effects, call your doctor. Call your doctor for medical advice about side effects.  You may report side effects to your national health agency.  You may report side effects to the FDA at 1-466.964.1824. You may also report side effects at https://www.fda.gov/medwatch.  How is this drug best taken?   Use this drug as ordered by your doctor. Read all information given to you. Follow all instructions closely.  A keiry is placed under the skin in the upper arm. This is a minor surgery. The keiry must be changed every 3 years.  Have blood work checked as you have been told by the doctor. Talk with the doctor.  If you drink grapefruit juice or eat grapefruit often, talk with your doctor.  Be sure to have regular breast exams and gynecology check-ups. You will also need to do breast self-exams as you have been told.  High blood pressure has happened with this drug. Have your blood pressure checked as you have been told by your doctor.  This drug may affect certain lab tests. Tell all of your health care providers and lab workers that you take this  drug.  What do I do if I miss a dose?   Call your doctor to find out what to do.  How do I store and/or throw out this drug?   If you need to store this drug at home, talk with your doctor, nurse, or pharmacist about how to store it.  General drug facts   If your symptoms or health problems do not get better or if they become worse, call your doctor.  Do not share your drugs with others and do not take anyone else's drugs.  Keep all drugs in a safe place. Keep all drugs out of the reach of children and pets.  Throw away unused or  drugs. Do not flush down a toilet or pour down a drain unless you are told to do so. Check with your pharmacist if you have questions about the best way to throw out drugs. There may be drug take-back programs in your area.  Some drugs may have another patient information leaflet. If you have any questions about this drug, please talk with your doctor, nurse, pharmacist, or other health care provider.  Some drugs may have another patient information leaflet. Check with your pharmacist. If you have any questions about this drug, please talk with your doctor, nurse, pharmacist, or other health care provider.  If you think there has been an overdose, call your poison control center or get medical care right away. Be ready to tell or show what was taken, how much, and when it happened.  Consumer Information Use and Disclaimer   This generalized information is a limited summary of diagnosis, treatment, and/or medication information. It is not meant to be comprehensive and should be used as a tool to help the user understand and/or assess potential diagnostic and treatment options. It does NOT include all information about conditions, treatments, medications, side effects, or risks that may apply to a specific patient. It is not intended to be medical advice or a substitute for the medical advice, diagnosis, or treatment of a health care provider based on the health care provider's  examination and assessment of a patient's specific and unique circumstances. Patients must speak with a health care provider for complete information about their health, medical questions, and treatment options, including any risks or benefits regarding use of medications. This information does not endorse any treatments or medications as safe, effective, or approved for treating a specific patient. Up[employer], Inc[employer] and its affiliates disclaim any warranty or liability relating to this information or the use thereof. The use of this information is governed by the Terms of Use, available at https://www.[employer] Corporationuwer.com/en/know/clinical-effectiveness-terms.  Last Reviewed Date   2023-11-07  Copyright   © 2024 [employer]Date, Inc. and its affiliates and/or licensors. All rights reserved.

## 2025-01-07 NOTE — PROGRESS NOTES
"Universal Protocol:  procedure performed by consultantConsent: Verbal consent obtained.  Risks and benefits: risks, benefits and alternatives were discussed  Consent given by: patient  Time out: Immediately prior to procedure a \"time out\" was called to verify the correct patient, procedure, equipment, support staff and site/side marked as required.  Timeout called at: 1/7/2025 1:45 PM.  Patient understanding: patient states understanding of the procedure being performed  Patient identity confirmed: verbally with patient  Remove and insert drug implant    Date/Time: 1/7/2025 1:00 PM    Performed by: KIM Moreira  Authorized by: KIM Moreira    Indication:     Indication: Presence of non-biodegradable drug delivery implant    Pre-procedure:     Pre-procedure timeout performed: yes      Prepped with: povidone-iodine      Local anesthetic:  Lidocaine with epinephrine    The site was cleaned and prepped in a sterile fashion: yes    Procedure:     Procedure:  Removal    Small stab incision was made in arm: yes      Left/right:  Left    Visualization of implant was obtained: yes      Site was closed with steri-strips and pressure bandage applied: yes    Comments:      After prepping the skin, a small stab incision was made. Implant was grasped with needle clamps and removed without complication. Pressure dressing and steri strips were applied. Instructions given for removal of dressing.  A new Nexplanon was placed next. All questions were answered.    "

## 2025-01-07 NOTE — PROGRESS NOTES
"Universal Protocol:  procedure performed by consultantConsent: Written consent obtained.  Risks and benefits: risks, benefits and alternatives were discussed  Consent given by: patient  Time out: Immediately prior to procedure a \"time out\" was called to verify the correct patient, procedure, equipment, support staff and site/side marked as required.  Timeout called at: 1/7/2025 1:59 PM.  Patient understanding: patient states understanding of the procedure being performed  Patient identity confirmed: verbally with patient  Remove and insert drug implant    Date/Time: 1/7/2025 1:00 PM    Performed by: KIM Moreira  Authorized by: KIM Moreira    Indication:     Indication: Insertion of non-biodegradable drug delivery implant    Pre-procedure:     Pre-procedure timeout performed: yes      Prepped with: povidone-iodine      Local anesthetic:  Lidocaine with epinephrine    The site was cleaned and prepped in a sterile fashion: yes    Procedure:     Procedure:  Insertion    Left/right:  Left    Preloaded contraceptive capsule trocar was placed subdermally: yes      Visualization of implant was obtained: yes      Contraceptive capsule was inserted and trocar removed: yes      Visualization of notch in stylet and palpation of device: yes      Palpation confirms placement by provider and patient: yes      Site was closed with steri-strips and pressure bandage applied: yes    Comments:      Return in 3 months for menses recheck and annual. Pressure dressing and steri strips were applied. Instructions given for removal of dressing. All questions were answered.    "

## 2025-03-25 ENCOUNTER — ANNUAL EXAM (OUTPATIENT)
Age: 25
End: 2025-03-25
Payer: COMMERCIAL

## 2025-03-25 VITALS
DIASTOLIC BLOOD PRESSURE: 78 MMHG | HEIGHT: 71 IN | BODY MASS INDEX: 41.02 KG/M2 | SYSTOLIC BLOOD PRESSURE: 118 MMHG | WEIGHT: 293 LBS

## 2025-03-25 DIAGNOSIS — N93.0 POSTCOITAL BLEEDING: ICD-10-CM

## 2025-03-25 DIAGNOSIS — Z30.46 ENCOUNTER FOR SURVEILLANCE OF NEXPLANON SUBDERMAL CONTRACEPTIVE: ICD-10-CM

## 2025-03-25 DIAGNOSIS — Z01.419 ENCOUNTER FOR ANNUAL ROUTINE GYNECOLOGICAL EXAMINATION: Primary | ICD-10-CM

## 2025-03-25 DIAGNOSIS — Z11.3 SCREENING FOR STDS (SEXUALLY TRANSMITTED DISEASES): ICD-10-CM

## 2025-03-25 PROBLEM — G47.33 OSA (OBSTRUCTIVE SLEEP APNEA): Status: ACTIVE | Noted: 2025-01-27

## 2025-03-25 PROCEDURE — S0612 ANNUAL GYNECOLOGICAL EXAMINA: HCPCS | Performed by: NURSE PRACTITIONER

## 2025-03-25 PROCEDURE — 87491 CHLMYD TRACH DNA AMP PROBE: CPT | Performed by: NURSE PRACTITIONER

## 2025-03-25 PROCEDURE — 87591 N.GONORRHOEAE DNA AMP PROB: CPT | Performed by: NURSE PRACTITIONER

## 2025-03-25 RX ORDER — TIRZEPATIDE 7.5 MG/.5ML
INJECTION, SOLUTION SUBCUTANEOUS
COMMUNITY
Start: 2025-03-19

## 2025-03-25 NOTE — PROGRESS NOTES
Name: Mary Beth Knowles      : 2000      MRN: [id number]  Encounter Provider: KIM Moreira  Encounter Date: 3/25/2025   Encounter department: [facility] [place]  :  Assessment & Plan  Encounter for annual routine gynecological examination  Pap every 3 years if normal.  STI testing as indicated.  Exercise most days of week-minimum of 150-300 minutes per week.   Obtain appropriate diet and hydration.   Calcium 1000mg (in divided doses-max 600 mg at one time) + 600 vit D daily.  Condom use when sexually active for sexually transmitted infection prevention.   Gardasil series received  Call your insurance company to verify coverage prior to completing any ordered tests.   Return to office in one year or sooner, if needed.          Encounter for surveillance of Nexplanon subdermal contraceptive  Nexplanon was placed 2025       Postcoital bleeding  Patient will try coconut oil with sex.  If continues she may need to repeat the pelvic ultrasound but it was normal in 2024       Screening for STDs (sexually transmitted diseases)    Orders:    Chlamydia/GC amplified DNA by PCR        History of Present Illness   LMP 3/10/2025  Irregular cycles every month and a half to two months  Sexually active yes  Monogamous  Birth control nexplanon  History of abnormal pap: none   Last pap 2024, Findings neg , Next pap: 3 yrs   Self breast exam yes  HPV vaccine series completed: yes    Any issues or concerns: has realized she is bleeding after intercourse for the past 3 years, pelvic u/s nml 10/2024  FH Breast/Ovarian cancer: none  FH Uterine cancer: none  FH Colon cancer: none     Past Medical History:  No date: Anxiety  No date: Autism  No date: Depression  No date: Epilepsy (HCC)  No date: Hypertension  No date: Ovarian cyst  No date: Seasonal allergies  No date: Seizures (HCC)      Comment:  last was a year ago  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  No date: Tendonitis      Past Surgical History:  No date:  SECTION  No date: CHOLECYSTECTOMY      Comment:  10/20  No date: NO PAST SURGERIES  10/15/2020: [facility] LAPAROSCOPY SURG CHOLECYSTECTOMY; N/A      Comment:  Procedure: CHOLECYSTECTOMY LAPAROSCOPIC;  Surgeon: Berhane Purcell MD;  Location:  MAIN OR;  Service: General      Social History    Tobacco Use      Smoking status: Never      Smokeless tobacco: Never    Vaping Use      Vaping status: Never Used    Alcohol use: Yes      Comment: occasion    Drug use: Not Currently      Types: Marijuana      Comment: medical marijuana             Mary Beth Knowles is a 24 y.o. female who presents for annual exam.  She is doing well on her Nexplanon.  She is reporting some recent postcoital bleeding.  Her pelvic ultrasound was normal in .for annual exam. GC/CT done today. She denies any vaginal issues. She denies pelvic pain or dyspareunia. She denies any issues with her current BCM. She is currently sexually active, partner she met online soraya and lives with him. She sees Dr. Dixon at [facility] for her seizures which she maintains contact with when she is having trouble.  She reports no seizures in over 3 years. Gardasil series completed in . She has a hx of ovarian cysts.     2 daughters, twins, almost 3 yrs old.  [facility]  She is in school for coding games, medical coding etc.    Review of Systems   Constitutional:  Negative for chills, fatigue, fever and unexpected weight change.   Respiratory:  Negative for shortness of breath.    Gastrointestinal:  Negative for anal bleeding, blood in stool, constipation and diarrhea.   Genitourinary:  Negative for difficulty urinating, dysuria and hematuria.   Neurological:  Negative for weakness.   Psychiatric/Behavioral:  Negative for agitation, behavioral problems and confusion.           Objective   /78   Ht 5' 11\" (1.803 m)   Wt (!) 161 kg (356 lb)   LMP 03/10/2025 (Approximate)   " BMI 49.65 kg/m²      Physical Exam  Constitutional:       General: She is not in acute distress.     Appearance: She is well-developed.      Comments: LEFT ARM NEXPLANON IN PLACE   HENT:      Head: Normocephalic.   Pulmonary:      Effort: Pulmonary effort is normal.   Chest:   Breasts:     Breasts are symmetrical.      Right: No inverted nipple, mass, nipple discharge, skin change or tenderness.      Left: No inverted nipple, mass, nipple discharge, skin change or tenderness.   Abdominal:      Palpations: Abdomen is soft.   Genitourinary:     Exam position: Supine.      Labia:         Right: No rash, tenderness, lesion or injury.         Left: No rash, tenderness, lesion or injury.       Vagina: No signs of injury and foreign body. No vaginal discharge, erythema or tenderness.      Cervix: No cervical motion tenderness, discharge or friability.      Uterus: Not deviated, not enlarged, not fixed and not tender.       Adnexa:         Right: No mass, tenderness or fullness.          Left: No mass, tenderness or fullness.     Musculoskeletal:      Cervical back: Normal range of motion and neck supple.

## 2025-03-27 ENCOUNTER — RESULTS FOLLOW-UP (OUTPATIENT)
Age: 25
End: 2025-03-27

## 2025-03-27 LAB
C TRACH DNA SPEC QL NAA+PROBE: NEGATIVE
N GONORRHOEA DNA SPEC QL NAA+PROBE: NEGATIVE

## 2025-04-22 ENCOUNTER — OFFICE VISIT (OUTPATIENT)
Dept: URGENT CARE | Facility: MEDICAL CENTER | Age: 25
End: 2025-04-22
Payer: COMMERCIAL

## 2025-04-22 VITALS
OXYGEN SATURATION: 98 % | SYSTOLIC BLOOD PRESSURE: 116 MMHG | HEIGHT: 71 IN | DIASTOLIC BLOOD PRESSURE: 77 MMHG | WEIGHT: 293 LBS | RESPIRATION RATE: 18 BRPM | TEMPERATURE: 98.4 F | BODY MASS INDEX: 41.02 KG/M2 | HEART RATE: 110 BPM

## 2025-04-22 DIAGNOSIS — J02.0 STREP PHARYNGITIS: Primary | ICD-10-CM

## 2025-04-22 DIAGNOSIS — J02.9 ACUTE PHARYNGITIS, UNSPECIFIED ETIOLOGY: ICD-10-CM

## 2025-04-22 PROCEDURE — S9083 URGENT CARE CENTER GLOBAL: HCPCS | Performed by: PHYSICIAN ASSISTANT

## 2025-04-22 PROCEDURE — G0382 LEV 3 HOSP TYPE B ED VISIT: HCPCS | Performed by: PHYSICIAN ASSISTANT

## 2025-04-22 RX ORDER — AMOXICILLIN 500 MG/1
500 CAPSULE ORAL EVERY 12 HOURS SCHEDULED
Qty: 20 CAPSULE | Refills: 0 | Status: SHIPPED | OUTPATIENT
Start: 2025-04-22 | End: 2025-05-02

## 2025-04-22 NOTE — PATIENT INSTRUCTIONS
Pharyngitis  Amoxicillin as directed  Salt water gargles  Follow up with PCP in 3-5 days.  Proceed to  ER if symptoms worsen.

## 2025-04-22 NOTE — PROGRESS NOTES
[facility]        NAME: Mary Beth Knowles is a 25 y.o. female  : 2000    MRN: [id number]  DATE: 2025  TIME: 8:54 AM    Assessment and Plan   Strep pharyngitis [J02.0]  1. Strep pharyngitis        2. Acute pharyngitis, unspecified etiology              Patient Instructions     Pharyngitis  Amoxicillin as directed  Salt water gargles  Follow up with PCP in 3-5 days.  Proceed to  ER if symptoms worsen.    Chief Complaint     Chief Complaint   Patient presents with    Cold Like Symptoms     Started 10 days ago with cough, sore throat, breathing labored, congestion.         History of Present Illness       25-year-old female who presents complaining of cough, congestion, sore throat, pain on swallowing x 7 days.  Denies fever, chills, nausea, vomiting, chest pain, shortness of breath.        Review of Systems   Review of Systems   Constitutional:  Negative for activity change, appetite change, chills, diaphoresis, fatigue and fever.   HENT:  Positive for congestion, ear pain, rhinorrhea and sore throat. Negative for ear discharge, facial swelling, hearing loss, mouth sores, nosebleeds, postnasal drip, sinus pressure, sinus pain, sneezing and voice change.    Respiratory:  Positive for cough. Negative for apnea, choking, chest tightness, shortness of breath, wheezing and stridor.    Cardiovascular: Negative.          Current Medications       Current Outpatient Medications:     Advair -21 MCG/ACT inhaler, INHALE 2 PUFFS 2 (TWO) TIMES A DAY RINSE MOUTH AFTER USE., Disp: 12 g, Rfl: 0    albuterol (ProAir HFA) 90 mcg/act inhaler, Inhale 2 puffs every 6 (six) hours as needed for wheezing, Disp: 8.5 g, Rfl: 0    cholecalciferol (VITAMIN D3) 400 units tablet, Take 400 Units by mouth daily, Disp: , Rfl:     clonazePAM (KlonoPIN) 2 mg tablet, Place 2 mg under the tongue as needed for seizures, Disp: , Rfl:     lamoTRIgine  MG TB24, Take 1 tablet by mouth 2 (two) times a day, Disp: ,  Rfl:     Zepbound 5 MG/0.5ML auto-injector, Inject 5 mg under the skin Once a week, Disp: , Rfl:     Zepbound 7.5 MG/0.5ML auto-injector, INJECT 0.5 ML UNDER SKIN EVERY 7 DAYS, Disp: , Rfl:     zonisamide (ZONEGRAN) 100 mg capsule, 100 mg 2 (two) times a day , Disp: , Rfl:     Current Facility-Administered Medications:     etonogestrel (NEXPLANON) subdermal implant 68 mg, 68 mg, Subdermal, Once every 3 years, , 68 mg at 25 1333    Current Allergies     Allergies as of 2025 - Reviewed 2025   Allergen Reaction Noted    Cetirizine hcl  11/15/2018    Klonopin [clonazepam] Other (See Comments) 2020            The following portions of the patient's history were reviewed and updated as appropriate: allergies, current medications, past family history, past medical history, past social history, past surgical history and problem list.     Past Medical History:   Diagnosis Date    Anxiety     Asthma     Autism     Depression     Epilepsy (HCC)     Hypertension     Ovarian cyst     Seasonal allergies     Seizures (HCC)     last was a year ago    Sensory integration disorder     Tendonitis        Past Surgical History:   Procedure Laterality Date     SECTION      CHOLECYSTECTOMY      10/20    NO PAST SURGERIES      [facility]G CHOLECYSTECTOMY N/A 10/15/2020    Procedure: CHOLECYSTECTOMY LAPAROSCOPIC;  Surgeon: Berhane Purcell MD;  Location:  MAIN OR;  Service: General       Family History   Problem Relation Age of Onset    Thyroid cancer Mother     Hiatal hernia Mother     Asthma Mother     Factor V Leiden deficiency Mother     Cancer Mother     Esophageal cancer Maternal Grandmother     Diabetes Maternal Grandfather     Heart failure Maternal Grandfather     Hypertension Maternal Grandfather     Diabetes Family     Heart disease Family     Hypertension Family          Medications have been verified.        Objective   /77   Pulse (!) 110   Temp 98.4 °F (36.9 °C)   Resp 18   Ht  "5' 11\" (1.803 m)   Wt (!) 163 kg (359 lb)   LMP 03/10/2025 (Approximate)   SpO2 98%   BMI 50.07 kg/m²        Physical Exam     Physical Exam  Constitutional:       General: She is not in acute distress.     Appearance: Normal appearance. She is well-developed. She is not diaphoretic.   HENT:      Head: Normocephalic and atraumatic.      Right Ear: Hearing, tympanic membrane, ear canal and external ear normal.      Left Ear: Hearing, tympanic membrane, ear canal and external ear normal.      Nose: Rhinorrhea present. No congestion.      Mouth/Throat:      Mouth: Mucous membranes are moist.      Pharynx: Oropharynx is clear. Uvula midline. Posterior oropharyngeal erythema present. No oropharyngeal exudate.   Cardiovascular:      Rate and Rhythm: Normal rate and regular rhythm.      Pulses: Normal pulses.      Heart sounds: Normal heart sounds.   Pulmonary:      Effort: Pulmonary effort is normal. No respiratory distress.      Breath sounds: Normal breath sounds. No stridor. No wheezing, rhonchi or rales.   Chest:      Chest wall: No tenderness.   Musculoskeletal:      Cervical back: Normal range of motion and neck supple.   Lymphadenopathy:      Cervical: Cervical adenopathy present.   Neurological:      Mental Status: She is alert.                   "

## 2025-04-24 ENCOUNTER — APPOINTMENT (OUTPATIENT)
Dept: LAB | Facility: MEDICAL CENTER | Age: 25
End: 2025-04-24

## 2025-04-24 DIAGNOSIS — Z00.6 ENCOUNTER FOR EXAMINATION FOR NORMAL COMPARISON OR CONTROL IN CLINICAL RESEARCH PROGRAM: ICD-10-CM

## 2025-04-24 PROCEDURE — 36415 COLL VENOUS BLD VENIPUNCTURE: CPT

## 2025-04-27 ENCOUNTER — OFFICE VISIT (OUTPATIENT)
Dept: URGENT CARE | Facility: MEDICAL CENTER | Age: 25
End: 2025-04-27
Payer: COMMERCIAL

## 2025-04-27 VITALS
WEIGHT: 293 LBS | HEIGHT: 71 IN | RESPIRATION RATE: 18 BRPM | HEART RATE: 80 BPM | DIASTOLIC BLOOD PRESSURE: 58 MMHG | SYSTOLIC BLOOD PRESSURE: 125 MMHG | TEMPERATURE: 98 F | OXYGEN SATURATION: 98 % | BODY MASS INDEX: 41.02 KG/M2

## 2025-04-27 DIAGNOSIS — M54.16 LUMBAR RADICULOPATHY: Primary | ICD-10-CM

## 2025-04-27 DIAGNOSIS — M54.50 ACUTE RIGHT-SIDED LOW BACK PAIN, UNSPECIFIED WHETHER SCIATICA PRESENT: ICD-10-CM

## 2025-04-27 PROCEDURE — G0382 LEV 3 HOSP TYPE B ED VISIT: HCPCS | Performed by: PHYSICIAN ASSISTANT

## 2025-04-27 PROCEDURE — S9083 URGENT CARE CENTER GLOBAL: HCPCS | Performed by: PHYSICIAN ASSISTANT

## 2025-04-27 RX ORDER — PREDNISONE 20 MG/1
40 TABLET ORAL DAILY
Qty: 10 TABLET | Refills: 0 | Status: SHIPPED | OUTPATIENT
Start: 2025-04-27 | End: 2025-05-02

## 2025-04-27 NOTE — PROGRESS NOTES
"  [facility]        NAME: Mary Beth Knowles is a 25 y.o. female  : 2000    MRN: [id number]  DATE: 2025  TIME: 2:54 PM      Assessment and Plan     Lumbar radiculopathy [M54.16]  1. Lumbar radiculopathy  predniSONE 20 mg tablet      2. Acute right-sided low back pain, unspecified whether sciatica present            POC Testing Results        Note:       Patient Instructions     Patient Instructions    Please ice for 20 minutes at least four times daily      Please perform gentle range of motion four times daily    Prednisone for pain    If symptoms do not improve please follow up family doctor or orthopedist       Follow up with primary care provider.   Go to ER if symptoms worsen.    Chief Complaint     Chief Complaint   Patient presents with    Leg Pain     Right upper leg pain radiating into the groin; states it is associated with right leg weakness; has been giving out with ambulation; states it feels like someone is \"stabbing her in the leg with a screw \"          History of Present Illness     Pt reports right thigh pain that is a band across the top of her leg just below the groin. She denies any injury or numbness/tingling. She reports sharp shooting pain with weakness when she tries standing, denies pain at rest.     Leg Pain   Pertinent negatives include no numbness.       Review of Systems     Review of Systems   Constitutional:  Negative for fever.   Musculoskeletal:  Positive for arthralgias, gait problem and myalgias.   Skin:  Negative for color change, rash and wound.   Neurological:  Negative for syncope and numbness.         Current Medications       Current Outpatient Medications:     predniSONE 20 mg tablet, Take 2 tablets (40 mg total) by mouth daily for 5 days, Disp: 10 tablet, Rfl: 0    Advair -21 MCG/ACT inhaler, INHALE 2 PUFFS 2 (TWO) TIMES A DAY RINSE MOUTH AFTER USE., Disp: 12 g, Rfl: 0    albuterol (ProAir HFA) 90 mcg/act inhaler, Inhale 2 puffs " every 6 (six) hours as needed for wheezing, Disp: 8.5 g, Rfl: 0    amoxicillin (AMOXIL) 500 mg capsule, Take 1 capsule (500 mg total) by mouth every 12 (twelve) hours for 10 days, Disp: 20 capsule, Rfl: 0    cholecalciferol (VITAMIN D3) 400 units tablet, Take 400 Units by mouth daily, Disp: , Rfl:     clonazePAM (KlonoPIN) 2 mg tablet, Place 2 mg under the tongue as needed for seizures, Disp: , Rfl:     lamoTRIgine  MG TB24, Take 1 tablet by mouth 2 (two) times a day, Disp: , Rfl:     Zepbound 5 MG/0.5ML auto-injector, Inject 5 mg under the skin Once a week, Disp: , Rfl:     Zepbound 7.5 MG/0.5ML auto-injector, INJECT 0.5 ML UNDER SKIN EVERY 7 DAYS, Disp: , Rfl:     zonisamide (ZONEGRAN) 100 mg capsule, 100 mg 2 (two) times a day , Disp: , Rfl:     Current Facility-Administered Medications:     etonogestrel (NEXPLANON) subdermal implant 68 mg, 68 mg, Subdermal, Once every 3 years, , 68 mg at 25 1333    Current Allergies     Allergies as of 2025 - Reviewed 2025   Allergen Reaction Noted    Cetirizine hcl  11/15/2018    Klonopin [clonazepam] Other (See Comments) 2020              The following portions of the patient's history were reviewed and updated as appropriate: allergies, current medications, past family history, past medical history, past social history, past surgical history, and problem list.     Past Medical History:   Diagnosis Date    Anxiety     Asthma     Autism     Depression     Epilepsy (HCC)     Hypertension     Ovarian cyst     Seasonal allergies     Seizures (HCC)     last was a year ago    Sensory integration disorder     Tendonitis        Past Surgical History:   Procedure Laterality Date     SECTION      CHOLECYSTECTOMY      10/20    NO PAST SURGERIES      [facility] LAPAROSCOPY SURG CHOLECYSTECTOMY N/A 10/15/2020    Procedure: CHOLECYSTECTOMY LAPAROSCOPIC;  Surgeon: Behrane Purcell MD;  Location:  MAIN OR;  Service: General       Family History   Problem Relation  "Age of Onset    Thyroid cancer Mother     Hiatal hernia Mother     Asthma Mother     Factor V Leiden deficiency Mother     Cancer Mother     Esophageal cancer Maternal Grandmother     Diabetes Maternal Grandfather     Heart failure Maternal Grandfather     Hypertension Maternal Grandfather     Diabetes Family     Heart disease Family     Hypertension Family          Medications have been verified.        Objective     /58   Pulse 80   Temp 98 °F (36.7 °C) (Temporal)   Resp 18   Ht 5' 11\" (1.803 m)   Wt (!) 163 kg (359 lb)   SpO2 98%   BMI 50.07 kg/m²   No LMP recorded. Patient has had an implant.         Physical Exam     Physical Exam  Constitutional:       Appearance: Normal appearance.   HENT:      Head: Normocephalic and atraumatic.   Eyes:      Conjunctiva/sclera: Conjunctivae normal.   Cardiovascular:      Rate and Rhythm: Normal rate and regular rhythm.      Pulses: Normal pulses.   Pulmonary:      Effort: Pulmonary effort is normal.   Abdominal:      Tenderness: There is no abdominal tenderness.   Musculoskeletal:      Lumbar back: Tenderness present. No swelling, edema, deformity, signs of trauma, lacerations, spasms or bony tenderness. Normal range of motion. Negative right straight leg raise test. No scoliosis.      Right hip: Tenderness present. No deformity, lacerations, bony tenderness or crepitus. Decreased range of motion. Decreased strength.      Right upper leg: Tenderness present. No swelling, edema, deformity, lacerations or bony tenderness.        Legs:       Comments: Pt tender diffusely over lower thoracic and lumbar spine, ROM intact, negative SLR. Pt is tender over proximal anterior right upper leg. Pt has pain with flexion at hip, reduced flexion, unable to extend due to pain   Skin:     General: Skin is warm and dry.   Neurological:      General: No focal deficit present.      Mental Status: She is alert and oriented to person, place, and time. Mental status is at baseline. "   Psychiatric:         Mood and Affect: Mood normal.         Behavior: Behavior normal.         Thought Content: Thought content normal.         Judgment: Judgment normal.

## 2025-04-27 NOTE — LETTER
April 27, 2025     Patient: Mary Beth Knowles   YOB: 2000   Date of Visit: 4/27/2025       To Whom It May Concern:    It is my medical opinion that Mary Beth Knowles may return to work on 4/28/25 .    If you have any questions or concerns, please don't hesitate to call.         Sincerely,        Carri Carlson PA-C    CC: No Recipients

## 2025-04-27 NOTE — PATIENT INSTRUCTIONS
Please ice for 20 minutes at least four times daily      Please perform gentle range of motion four times daily    Prednisone for pain    If symptoms do not improve please follow up family doctor or orthopedist

## 2025-05-04 LAB
APOB+LDLR+PCSK9 GENE MUT ANL BLD/T: NOT DETECTED
BRCA1+BRCA2 DEL+DUP + FULL MUT ANL BLD/T: NOT DETECTED
MLH1+MSH2+MSH6+PMS2 GN DEL+DUP+FUL M: NOT DETECTED

## 2025-08-14 ENCOUNTER — OFFICE VISIT (OUTPATIENT)
Dept: URGENT CARE | Facility: MEDICAL CENTER | Age: 25
End: 2025-08-14
Payer: COMMERCIAL

## (undated) DEVICE — STERILE POLYISOPRENE POWDER-FREE SURGICAL GLOVES: Brand: PROTEXIS

## (undated) DEVICE — ENDOPATH 5MM CURVED SCISSORS WITH MONOPOLAR CAUTERY: Brand: ENDOPATH

## (undated) DEVICE — STERILE POLYISOPRENE POWDER-FREE SURGICAL GLOVES WITH EMOLLIENT COATING: Brand: PROTEXIS

## (undated) DEVICE — SCD SEQUENTIAL COMPRESSION COMFORT SLEEVE LARGE KNEE LENGTH: Brand: KENDALL SCD

## (undated) DEVICE — 3M™ STERI-STRIP™ REINFORCED ADHESIVE SKIN CLOSURES, R1547, 1/2 IN X 4 IN (12 MM X 100 MM), 6 STRIPS/ENVELOPE: Brand: 3M™ STERI-STRIP™

## (undated) DEVICE — ENDOPATH PNEUMONEEDLE INSUFFLATION NEEDLES WITH LUER LOCK CONNECTORS 120MM: Brand: ENDOPATH

## (undated) DEVICE — SUT VICRYL 0 UR-6 27 IN J603H

## (undated) DEVICE — ENDOPOUCH RETRIEVER SPECIMEN RETRIEVAL BAGS: Brand: ENDOPOUCH RETRIEVER

## (undated) DEVICE — TUBING SET W. FILTER, GAS FLOW 30 L/MIN: Brand: N.A.

## (undated) DEVICE — SINGLE PORT MANIFOLD: Brand: NEPTUNE 2

## (undated) DEVICE — TROCAR: Brand: KII FIOS FIRST ENTRY

## (undated) DEVICE — TROCAR: Brand: KII® SLEEVE

## (undated) DEVICE — ALLENTOWN LAP CHOLE APP PACK: Brand: CARDINAL HEALTH

## (undated) DEVICE — CHLORAPREP HI-LITE 26ML ORANGE

## (undated) DEVICE — SWABSTCK, BENZOIN TINCTURE, 1/PK, STRL: Brand: APLICARE

## (undated) DEVICE — INTENDED FOR TISSUE SEPARATION, AND OTHER PROCEDURES THAT REQUIRE A SHARP SURGICAL BLADE TO PUNCTURE OR CUT.: Brand: BARD-PARKER SAFETY BLADES SIZE 11, STERILE

## (undated) DEVICE — SUT MONOCRYL 4-0 PS-2 27 IN Y426H

## (undated) DEVICE — LIGAMAX 5 MM ENDOSCOPIC MULTIPLE CLIP APPLIER: Brand: LIGAMAX

## (undated) DEVICE — IRRIG ENDO FLO TUBING